# Patient Record
Sex: FEMALE | Race: WHITE | NOT HISPANIC OR LATINO | Employment: OTHER | URBAN - METROPOLITAN AREA
[De-identification: names, ages, dates, MRNs, and addresses within clinical notes are randomized per-mention and may not be internally consistent; named-entity substitution may affect disease eponyms.]

---

## 2024-02-20 ENCOUNTER — ANESTHESIA (OUTPATIENT)
Dept: SURGERY | Facility: CLINIC | Age: 34
End: 2024-02-20
Payer: COMMERCIAL

## 2024-02-20 ENCOUNTER — APPOINTMENT (OUTPATIENT)
Dept: SURGERY | Facility: PHYSICIAN GROUP | Age: 34
End: 2024-02-20
Payer: COMMERCIAL

## 2024-02-20 ENCOUNTER — APPOINTMENT (OUTPATIENT)
Dept: ULTRASOUND IMAGING | Facility: CLINIC | Age: 34
End: 2024-02-20
Attending: EMERGENCY MEDICINE
Payer: COMMERCIAL

## 2024-02-20 ENCOUNTER — HOSPITAL ENCOUNTER (INPATIENT)
Facility: CLINIC | Age: 34
LOS: 1 days | Discharge: HOME | End: 2024-02-21
Attending: EMERGENCY MEDICINE | Admitting: SURGERY
Payer: COMMERCIAL

## 2024-02-20 ENCOUNTER — HOSPITAL ENCOUNTER (OUTPATIENT)
Facility: CLINIC | Age: 34
End: 2024-02-20
Attending: SURGERY | Admitting: SURGERY
Payer: COMMERCIAL

## 2024-02-20 ENCOUNTER — ANESTHESIA EVENT (OUTPATIENT)
Dept: SURGERY | Facility: CLINIC | Age: 34
End: 2024-02-20
Payer: COMMERCIAL

## 2024-02-20 DIAGNOSIS — K35.30 ACUTE APPENDICITIS WITH LOCALIZED PERITONITIS, UNSPECIFIED WHETHER ABSCESS PRESENT, UNSPECIFIED WHETHER GANGRENE PRESENT, UNSPECIFIED WHETHER PERFORATION PRESENT: ICD-10-CM

## 2024-02-20 DIAGNOSIS — K35.30 ACUTE APPENDICITIS WITH LOCALIZED PERITONITIS, WITHOUT PERFORATION, ABSCESS, OR GANGRENE: Primary | ICD-10-CM

## 2024-02-20 LAB
ABO/RH(D): NORMAL
ALBUMIN UR-MCNC: 50 MG/DL
ANION GAP SERPL CALCULATED.3IONS-SCNC: 11 MMOL/L (ref 7–15)
APPEARANCE UR: ABNORMAL
BACTERIA #/AREA URNS HPF: ABNORMAL /HPF
BASOPHILS # BLD AUTO: 0 10E3/UL (ref 0–0.2)
BASOPHILS NFR BLD AUTO: 0 %
BILIRUB UR QL STRIP: NEGATIVE
BUN SERPL-MCNC: 8.1 MG/DL (ref 6–20)
CALCIUM SERPL-MCNC: 9.4 MG/DL (ref 8.6–10)
CHLORIDE SERPL-SCNC: 98 MMOL/L (ref 98–107)
COLOR UR AUTO: ABNORMAL
CREAT SERPL-MCNC: 0.65 MG/DL (ref 0.51–0.95)
DEPRECATED HCO3 PLAS-SCNC: 24 MMOL/L (ref 22–29)
EGFRCR SERPLBLD CKD-EPI 2021: >90 ML/MIN/1.73M2
EOSINOPHIL # BLD AUTO: 0.1 10E3/UL (ref 0–0.7)
EOSINOPHIL NFR BLD AUTO: 0 %
ERYTHROCYTE [DISTWIDTH] IN BLOOD BY AUTOMATED COUNT: 12.9 % (ref 10–15)
FLUAV RNA SPEC QL NAA+PROBE: NEGATIVE
FLUBV RNA RESP QL NAA+PROBE: NEGATIVE
GLUCOSE SERPL-MCNC: 88 MG/DL (ref 70–99)
GLUCOSE UR STRIP-MCNC: NEGATIVE MG/DL
GROUP A STREP BY PCR: NOT DETECTED
HCG INTACT+B SERPL-ACNC: 8435 MIU/ML
HCT VFR BLD AUTO: 35.8 % (ref 35–47)
HGB BLD-MCNC: 12.2 G/DL (ref 11.7–15.7)
HGB UR QL STRIP: ABNORMAL
HOLD SPECIMEN: NORMAL
HOLD SPECIMEN: NORMAL
IMM GRANULOCYTES # BLD: 0.1 10E3/UL
IMM GRANULOCYTES NFR BLD: 1 %
KETONES UR STRIP-MCNC: 40 MG/DL
LEUKOCYTE ESTERASE UR QL STRIP: ABNORMAL
LYMPHOCYTES # BLD AUTO: 1.3 10E3/UL (ref 0.8–5.3)
LYMPHOCYTES NFR BLD AUTO: 9 %
MCH RBC QN AUTO: 29.5 PG (ref 26.5–33)
MCHC RBC AUTO-ENTMCNC: 34.1 G/DL (ref 31.5–36.5)
MCV RBC AUTO: 87 FL (ref 78–100)
MONOCYTES # BLD AUTO: 1.1 10E3/UL (ref 0–1.3)
MONOCYTES NFR BLD AUTO: 8 %
MUCOUS THREADS #/AREA URNS LPF: PRESENT /LPF
NEUTROPHILS # BLD AUTO: 11.2 10E3/UL (ref 1.6–8.3)
NEUTROPHILS NFR BLD AUTO: 82 %
NITRATE UR QL: NEGATIVE
NRBC # BLD AUTO: 0 10E3/UL
NRBC BLD AUTO-RTO: 0 /100
PH UR STRIP: 6.5 [PH] (ref 5–7)
PLATELET # BLD AUTO: 361 10E3/UL (ref 150–450)
POTASSIUM SERPL-SCNC: 3.9 MMOL/L (ref 3.4–5.3)
RBC # BLD AUTO: 4.14 10E6/UL (ref 3.8–5.2)
RBC URINE: 5 /HPF
RSV RNA SPEC NAA+PROBE: NEGATIVE
SARS-COV-2 RNA RESP QL NAA+PROBE: NEGATIVE
SODIUM SERPL-SCNC: 133 MMOL/L (ref 135–145)
SP GR UR STRIP: 1.03 (ref 1–1.03)
SPECIMEN EXPIRATION DATE: NORMAL
SQUAMOUS EPITHELIAL: 4 /HPF
UROBILINOGEN UR STRIP-MCNC: NORMAL MG/DL
WBC # BLD AUTO: 13.8 10E3/UL (ref 4–11)
WBC URINE: 6 /HPF

## 2024-02-20 PROCEDURE — 88304 TISSUE EXAM BY PATHOLOGIST: CPT | Mod: TC | Performed by: SURGERY

## 2024-02-20 PROCEDURE — 258N000003 HC RX IP 258 OP 636: Performed by: ANESTHESIOLOGY

## 2024-02-20 PROCEDURE — 250N000013 HC RX MED GY IP 250 OP 250 PS 637: Performed by: ANESTHESIOLOGY

## 2024-02-20 PROCEDURE — 250N000009 HC RX 250: Performed by: EMERGENCY MEDICINE

## 2024-02-20 PROCEDURE — 250N000011 HC RX IP 250 OP 636: Performed by: ANESTHESIOLOGY

## 2024-02-20 PROCEDURE — 36415 COLL VENOUS BLD VENIPUNCTURE: CPT | Performed by: EMERGENCY MEDICINE

## 2024-02-20 PROCEDURE — 86900 BLOOD TYPING SEROLOGIC ABO: CPT | Performed by: EMERGENCY MEDICINE

## 2024-02-20 PROCEDURE — 250N000025 HC SEVOFLURANE, PER MIN: Performed by: SURGERY

## 2024-02-20 PROCEDURE — 88304 TISSUE EXAM BY PATHOLOGIST: CPT | Mod: 26 | Performed by: PATHOLOGY

## 2024-02-20 PROCEDURE — 44970 LAPAROSCOPY APPENDECTOMY: CPT | Mod: AS | Performed by: PHYSICIAN ASSISTANT

## 2024-02-20 PROCEDURE — 250N000013 HC RX MED GY IP 250 OP 250 PS 637: Performed by: SURGERY

## 2024-02-20 PROCEDURE — 370N000017 HC ANESTHESIA TECHNICAL FEE, PER MIN: Performed by: SURGERY

## 2024-02-20 PROCEDURE — 360N000076 HC SURGERY LEVEL 3, PER MIN: Performed by: SURGERY

## 2024-02-20 PROCEDURE — 272N000001 HC OR GENERAL SUPPLY STERILE: Performed by: SURGERY

## 2024-02-20 PROCEDURE — 44970 LAPAROSCOPY APPENDECTOMY: CPT | Performed by: SURGERY

## 2024-02-20 PROCEDURE — 99285 EMERGENCY DEPT VISIT HI MDM: CPT | Mod: 25

## 2024-02-20 PROCEDURE — 81001 URINALYSIS AUTO W/SCOPE: CPT | Performed by: EMERGENCY MEDICINE

## 2024-02-20 PROCEDURE — 0DTJ4ZZ RESECTION OF APPENDIX, PERCUTANEOUS ENDOSCOPIC APPROACH: ICD-10-PCS | Performed by: SURGERY

## 2024-02-20 PROCEDURE — 84702 CHORIONIC GONADOTROPIN TEST: CPT | Performed by: EMERGENCY MEDICINE

## 2024-02-20 PROCEDURE — 87651 STREP A DNA AMP PROBE: CPT | Performed by: EMERGENCY MEDICINE

## 2024-02-20 PROCEDURE — 76801 OB US < 14 WKS SINGLE FETUS: CPT

## 2024-02-20 PROCEDURE — 250N000011 HC RX IP 250 OP 636

## 2024-02-20 PROCEDURE — 80048 BASIC METABOLIC PNL TOTAL CA: CPT | Performed by: EMERGENCY MEDICINE

## 2024-02-20 PROCEDURE — 710N000009 HC RECOVERY PHASE 1, LEVEL 1, PER MIN: Performed by: SURGERY

## 2024-02-20 PROCEDURE — 99222 1ST HOSP IP/OBS MODERATE 55: CPT | Mod: 57 | Performed by: SURGERY

## 2024-02-20 PROCEDURE — 87086 URINE CULTURE/COLONY COUNT: CPT | Performed by: EMERGENCY MEDICINE

## 2024-02-20 PROCEDURE — 120N000004 HC R&B MS OVERFLOW

## 2024-02-20 PROCEDURE — 250N000011 HC RX IP 250 OP 636: Performed by: SURGERY

## 2024-02-20 PROCEDURE — 99221 1ST HOSP IP/OBS SF/LOW 40: CPT | Performed by: OBSTETRICS & GYNECOLOGY

## 2024-02-20 PROCEDURE — 87637 SARSCOV2&INF A&B&RSV AMP PRB: CPT | Performed by: EMERGENCY MEDICINE

## 2024-02-20 PROCEDURE — 85025 COMPLETE CBC W/AUTO DIFF WBC: CPT | Performed by: EMERGENCY MEDICINE

## 2024-02-20 PROCEDURE — 258N000001 HC RX 258: Performed by: SURGERY

## 2024-02-20 PROCEDURE — 999N000141 HC STATISTIC PRE-PROCEDURE NURSING ASSESSMENT: Performed by: SURGERY

## 2024-02-20 PROCEDURE — 250N000009 HC RX 250: Performed by: ANESTHESIOLOGY

## 2024-02-20 RX ORDER — NALOXONE HYDROCHLORIDE 0.4 MG/ML
0.4 INJECTION, SOLUTION INTRAMUSCULAR; INTRAVENOUS; SUBCUTANEOUS
Status: DISCONTINUED | OUTPATIENT
Start: 2024-02-20 | End: 2024-02-21 | Stop reason: HOSPADM

## 2024-02-20 RX ORDER — PIPERACILLIN SODIUM, TAZOBACTAM SODIUM 3; .375 G/15ML; G/15ML
3.38 INJECTION, POWDER, LYOPHILIZED, FOR SOLUTION INTRAVENOUS EVERY 6 HOURS
Status: DISCONTINUED | OUTPATIENT
Start: 2024-02-20 | End: 2024-02-21 | Stop reason: HOSPADM

## 2024-02-20 RX ORDER — ONDANSETRON 2 MG/ML
4 INJECTION INTRAMUSCULAR; INTRAVENOUS EVERY 30 MIN PRN
Status: DISCONTINUED | OUTPATIENT
Start: 2024-02-20 | End: 2024-02-20 | Stop reason: HOSPADM

## 2024-02-20 RX ORDER — ONDANSETRON 4 MG/1
4 TABLET, ORALLY DISINTEGRATING ORAL EVERY 30 MIN PRN
Status: DISCONTINUED | OUTPATIENT
Start: 2024-02-20 | End: 2024-02-20 | Stop reason: HOSPADM

## 2024-02-20 RX ORDER — CEFAZOLIN SODIUM/WATER 2 G/20 ML
2 SYRINGE (ML) INTRAVENOUS SEE ADMIN INSTRUCTIONS
Status: DISCONTINUED | OUTPATIENT
Start: 2024-02-20 | End: 2024-02-20 | Stop reason: HOSPADM

## 2024-02-20 RX ORDER — ONDANSETRON 4 MG/1
4 TABLET, ORALLY DISINTEGRATING ORAL EVERY 6 HOURS PRN
Status: DISCONTINUED | OUTPATIENT
Start: 2024-02-20 | End: 2024-02-21 | Stop reason: HOSPADM

## 2024-02-20 RX ORDER — LABETALOL HYDROCHLORIDE 5 MG/ML
10 INJECTION, SOLUTION INTRAVENOUS
Status: DISCONTINUED | OUTPATIENT
Start: 2024-02-20 | End: 2024-02-20 | Stop reason: HOSPADM

## 2024-02-20 RX ORDER — OXYCODONE HYDROCHLORIDE 5 MG/1
10 TABLET ORAL EVERY 4 HOURS PRN
Status: DISCONTINUED | OUTPATIENT
Start: 2024-02-20 | End: 2024-02-21 | Stop reason: HOSPADM

## 2024-02-20 RX ORDER — LIDOCAINE 40 MG/G
CREAM TOPICAL
Status: DISCONTINUED | OUTPATIENT
Start: 2024-02-20 | End: 2024-02-20 | Stop reason: HOSPADM

## 2024-02-20 RX ORDER — HYDRALAZINE HYDROCHLORIDE 20 MG/ML
10 INJECTION INTRAMUSCULAR; INTRAVENOUS EVERY 10 MIN PRN
Status: DISCONTINUED | OUTPATIENT
Start: 2024-02-20 | End: 2024-02-20 | Stop reason: HOSPADM

## 2024-02-20 RX ORDER — ONDANSETRON 2 MG/ML
4 INJECTION INTRAMUSCULAR; INTRAVENOUS EVERY 6 HOURS PRN
Status: DISCONTINUED | OUTPATIENT
Start: 2024-02-20 | End: 2024-02-21 | Stop reason: HOSPADM

## 2024-02-20 RX ORDER — SODIUM CHLORIDE, SODIUM LACTATE, POTASSIUM CHLORIDE, CALCIUM CHLORIDE 600; 310; 30; 20 MG/100ML; MG/100ML; MG/100ML; MG/100ML
INJECTION, SOLUTION INTRAVENOUS CONTINUOUS PRN
Status: DISCONTINUED | OUTPATIENT
Start: 2024-02-20 | End: 2024-02-20

## 2024-02-20 RX ORDER — LIDOCAINE HYDROCHLORIDE 20 MG/ML
INJECTION, SOLUTION INFILTRATION; PERINEURAL PRN
Status: DISCONTINUED | OUTPATIENT
Start: 2024-02-20 | End: 2024-02-20

## 2024-02-20 RX ORDER — SODIUM CHLORIDE, SODIUM LACTATE, POTASSIUM CHLORIDE, CALCIUM CHLORIDE 600; 310; 30; 20 MG/100ML; MG/100ML; MG/100ML; MG/100ML
INJECTION, SOLUTION INTRAVENOUS CONTINUOUS
Status: DISCONTINUED | OUTPATIENT
Start: 2024-02-20 | End: 2024-02-20 | Stop reason: HOSPADM

## 2024-02-20 RX ORDER — CEFAZOLIN SODIUM/WATER 2 G/20 ML
2 SYRINGE (ML) INTRAVENOUS
Status: DISCONTINUED | OUTPATIENT
Start: 2024-02-20 | End: 2024-02-20 | Stop reason: HOSPADM

## 2024-02-20 RX ORDER — BISACODYL 10 MG
10 SUPPOSITORY, RECTAL RECTAL DAILY PRN
Status: DISCONTINUED | OUTPATIENT
Start: 2024-02-20 | End: 2024-02-21 | Stop reason: HOSPADM

## 2024-02-20 RX ORDER — DEXMEDETOMIDINE HYDROCHLORIDE 4 UG/ML
INJECTION, SOLUTION INTRAVENOUS CONTINUOUS PRN
Status: DISCONTINUED | OUTPATIENT
Start: 2024-02-20 | End: 2024-02-20

## 2024-02-20 RX ORDER — NALOXONE HYDROCHLORIDE 0.4 MG/ML
0.2 INJECTION, SOLUTION INTRAMUSCULAR; INTRAVENOUS; SUBCUTANEOUS
Status: DISCONTINUED | OUTPATIENT
Start: 2024-02-20 | End: 2024-02-21 | Stop reason: HOSPADM

## 2024-02-20 RX ORDER — HYDROMORPHONE HCL IN WATER/PF 6 MG/30 ML
0.2 PATIENT CONTROLLED ANALGESIA SYRINGE INTRAVENOUS
Status: DISCONTINUED | OUTPATIENT
Start: 2024-02-20 | End: 2024-02-21 | Stop reason: HOSPADM

## 2024-02-20 RX ORDER — METHADONE HYDROCHLORIDE 10 MG/ML
2 INJECTION, SOLUTION INTRAMUSCULAR; INTRAVENOUS; SUBCUTANEOUS 3 TIMES DAILY PRN
Status: DISCONTINUED | OUTPATIENT
Start: 2024-02-20 | End: 2024-02-20 | Stop reason: HOSPADM

## 2024-02-20 RX ORDER — SODIUM CHLORIDE, SODIUM LACTATE, POTASSIUM CHLORIDE, CALCIUM CHLORIDE 600; 310; 30; 20 MG/100ML; MG/100ML; MG/100ML; MG/100ML
INJECTION, SOLUTION INTRAVENOUS CONTINUOUS
Status: DISCONTINUED | OUTPATIENT
Start: 2024-02-20 | End: 2024-02-21 | Stop reason: HOSPADM

## 2024-02-20 RX ORDER — DEXAMETHASONE SODIUM PHOSPHATE 4 MG/ML
INJECTION, SOLUTION INTRA-ARTICULAR; INTRALESIONAL; INTRAMUSCULAR; INTRAVENOUS; SOFT TISSUE PRN
Status: DISCONTINUED | OUTPATIENT
Start: 2024-02-20 | End: 2024-02-20

## 2024-02-20 RX ORDER — ACETAMINOPHEN 325 MG/1
975 TABLET ORAL ONCE
Status: COMPLETED | OUTPATIENT
Start: 2024-02-20 | End: 2024-02-20

## 2024-02-20 RX ORDER — PROCHLORPERAZINE MALEATE 5 MG
10 TABLET ORAL EVERY 6 HOURS PRN
Status: DISCONTINUED | OUTPATIENT
Start: 2024-02-20 | End: 2024-02-21 | Stop reason: HOSPADM

## 2024-02-20 RX ORDER — HYDROMORPHONE HCL IN WATER/PF 6 MG/30 ML
PATIENT CONTROLLED ANALGESIA SYRINGE INTRAVENOUS
Status: COMPLETED
Start: 2024-02-20 | End: 2024-02-20

## 2024-02-20 RX ORDER — CEFOTETAN DISODIUM 1 G/10ML
1 INJECTION, POWDER, FOR SOLUTION INTRAMUSCULAR; INTRAVENOUS ONCE
Status: COMPLETED | OUTPATIENT
Start: 2024-02-20 | End: 2024-02-20

## 2024-02-20 RX ORDER — OXYCODONE HYDROCHLORIDE 5 MG/1
5 TABLET ORAL EVERY 4 HOURS PRN
Status: DISCONTINUED | OUTPATIENT
Start: 2024-02-20 | End: 2024-02-21 | Stop reason: HOSPADM

## 2024-02-20 RX ORDER — HYDROMORPHONE HCL IN WATER/PF 6 MG/30 ML
0.4 PATIENT CONTROLLED ANALGESIA SYRINGE INTRAVENOUS
Status: DISCONTINUED | OUTPATIENT
Start: 2024-02-20 | End: 2024-02-21 | Stop reason: HOSPADM

## 2024-02-20 RX ORDER — METHADONE HYDROCHLORIDE 10 MG/ML
INJECTION, SOLUTION INTRAMUSCULAR; INTRAVENOUS; SUBCUTANEOUS PRN
Status: DISCONTINUED | OUTPATIENT
Start: 2024-02-20 | End: 2024-02-20

## 2024-02-20 RX ORDER — BUPIVACAINE HYDROCHLORIDE 5 MG/ML
INJECTION, SOLUTION EPIDURAL; INTRACAUDAL PRN
Status: DISCONTINUED | OUTPATIENT
Start: 2024-02-20 | End: 2024-02-20 | Stop reason: HOSPADM

## 2024-02-20 RX ORDER — ALBUTEROL SULFATE 0.83 MG/ML
2.5 SOLUTION RESPIRATORY (INHALATION) EVERY 4 HOURS PRN
Status: DISCONTINUED | OUTPATIENT
Start: 2024-02-20 | End: 2024-02-20 | Stop reason: HOSPADM

## 2024-02-20 RX ORDER — PROPOFOL 10 MG/ML
INJECTION, EMULSION INTRAVENOUS PRN
Status: DISCONTINUED | OUTPATIENT
Start: 2024-02-20 | End: 2024-02-20

## 2024-02-20 RX ORDER — POLYETHYLENE GLYCOL 3350 17 G/17G
17 POWDER, FOR SOLUTION ORAL DAILY
Status: DISCONTINUED | OUTPATIENT
Start: 2024-02-21 | End: 2024-02-21 | Stop reason: HOSPADM

## 2024-02-20 RX ORDER — ACETAMINOPHEN 325 MG/1
975 TABLET ORAL EVERY 8 HOURS
Status: DISCONTINUED | OUTPATIENT
Start: 2024-02-20 | End: 2024-02-21 | Stop reason: HOSPADM

## 2024-02-20 RX ORDER — MAGNESIUM SULFATE HEPTAHYDRATE 40 MG/ML
2 INJECTION, SOLUTION INTRAVENOUS
Status: DISCONTINUED | OUTPATIENT
Start: 2024-02-20 | End: 2024-02-20 | Stop reason: HOSPADM

## 2024-02-20 RX ORDER — ACETAMINOPHEN 325 MG/1
650 TABLET ORAL EVERY 4 HOURS PRN
Status: DISCONTINUED | OUTPATIENT
Start: 2024-02-23 | End: 2024-02-21 | Stop reason: HOSPADM

## 2024-02-20 RX ORDER — AMOXICILLIN 250 MG
1 CAPSULE ORAL 2 TIMES DAILY
Status: DISCONTINUED | OUTPATIENT
Start: 2024-02-20 | End: 2024-02-21 | Stop reason: HOSPADM

## 2024-02-20 RX ORDER — ONDANSETRON 2 MG/ML
INJECTION INTRAMUSCULAR; INTRAVENOUS PRN
Status: DISCONTINUED | OUTPATIENT
Start: 2024-02-20 | End: 2024-02-20

## 2024-02-20 RX ORDER — CALCIUM CARBONATE 500 MG/1
500 TABLET, CHEWABLE ORAL 4 TIMES DAILY PRN
Status: DISCONTINUED | OUTPATIENT
Start: 2024-02-20 | End: 2024-02-21 | Stop reason: HOSPADM

## 2024-02-20 RX ORDER — LIDOCAINE 40 MG/G
CREAM TOPICAL
Status: DISCONTINUED | OUTPATIENT
Start: 2024-02-20 | End: 2024-02-21 | Stop reason: HOSPADM

## 2024-02-20 RX ADMIN — HYDROMORPHONE HYDROCHLORIDE 0.2 MG: 0.2 INJECTION, SOLUTION INTRAMUSCULAR; INTRAVENOUS; SUBCUTANEOUS at 14:21

## 2024-02-20 RX ADMIN — SENNOSIDES AND DOCUSATE SODIUM 1 TABLET: 8.6; 5 TABLET ORAL at 20:42

## 2024-02-20 RX ADMIN — METHADONE HYDROCHLORIDE 10 MG: 10 INJECTION, SOLUTION INTRAMUSCULAR; INTRAVENOUS; SUBCUTANEOUS at 11:07

## 2024-02-20 RX ADMIN — PROPOFOL 150 MG: 10 INJECTION, EMULSION INTRAVENOUS at 11:07

## 2024-02-20 RX ADMIN — DEXAMETHASONE SODIUM PHOSPHATE 8 MG: 4 INJECTION, SOLUTION INTRA-ARTICULAR; INTRALESIONAL; INTRAMUSCULAR; INTRAVENOUS; SOFT TISSUE at 11:07

## 2024-02-20 RX ADMIN — PROPOFOL 50 MG: 10 INJECTION, EMULSION INTRAVENOUS at 11:09

## 2024-02-20 RX ADMIN — CEFOTETAN DISODIUM 1 G: 1 INJECTION, POWDER, FOR SOLUTION INTRAMUSCULAR; INTRAVENOUS at 09:28

## 2024-02-20 RX ADMIN — PIPERACILLIN AND TAZOBACTAM 3.38 G: 3; .375 INJECTION, POWDER, LYOPHILIZED, FOR SOLUTION INTRAVENOUS at 15:35

## 2024-02-20 RX ADMIN — LIDOCAINE HYDROCHLORIDE 50 MG: 20 INJECTION, SOLUTION INFILTRATION; PERINEURAL at 11:07

## 2024-02-20 RX ADMIN — SODIUM CHLORIDE, POTASSIUM CHLORIDE, SODIUM LACTATE AND CALCIUM CHLORIDE: 600; 310; 30; 20 INJECTION, SOLUTION INTRAVENOUS at 10:16

## 2024-02-20 RX ADMIN — SODIUM CHLORIDE, POTASSIUM CHLORIDE, SODIUM LACTATE AND CALCIUM CHLORIDE: 600; 310; 30; 20 INJECTION, SOLUTION INTRAVENOUS at 10:58

## 2024-02-20 RX ADMIN — PHENYLEPHRINE HYDROCHLORIDE 100 MCG: 10 INJECTION INTRAVENOUS at 11:21

## 2024-02-20 RX ADMIN — ACETAMINOPHEN 975 MG: 325 TABLET, FILM COATED ORAL at 16:48

## 2024-02-20 RX ADMIN — ACETAMINOPHEN 975 MG: 325 TABLET, FILM COATED ORAL at 10:15

## 2024-02-20 RX ADMIN — DEXMEDETOMIDINE 0.5 MCG/KG/HR: 100 INJECTION, SOLUTION, CONCENTRATE INTRAVENOUS at 11:11

## 2024-02-20 RX ADMIN — Medication 2 G: at 11:01

## 2024-02-20 RX ADMIN — SODIUM CHLORIDE, POTASSIUM CHLORIDE, SODIUM LACTATE AND CALCIUM CHLORIDE: 600; 310; 30; 20 INJECTION, SOLUTION INTRAVENOUS at 11:41

## 2024-02-20 RX ADMIN — METHADONE HYDROCHLORIDE 2 MG: 10 INJECTION, SOLUTION INTRAMUSCULAR; INTRAVENOUS; SUBCUTANEOUS at 12:38

## 2024-02-20 RX ADMIN — HYDROMORPHONE HYDROCHLORIDE 0.2 MG: 0.2 INJECTION, SOLUTION INTRAMUSCULAR; INTRAVENOUS; SUBCUTANEOUS at 20:42

## 2024-02-20 RX ADMIN — PHENYLEPHRINE HYDROCHLORIDE 100 MCG: 10 INJECTION INTRAVENOUS at 11:11

## 2024-02-20 RX ADMIN — SUGAMMADEX 200 MG: 100 INJECTION, SOLUTION INTRAVENOUS at 11:38

## 2024-02-20 RX ADMIN — ROCURONIUM BROMIDE 50 MG: 50 INJECTION, SOLUTION INTRAVENOUS at 11:07

## 2024-02-20 RX ADMIN — PIPERACILLIN AND TAZOBACTAM 3.38 G: 3; .375 INJECTION, POWDER, LYOPHILIZED, FOR SOLUTION INTRAVENOUS at 20:42

## 2024-02-20 RX ADMIN — ONDANSETRON 4 MG: 2 INJECTION INTRAMUSCULAR; INTRAVENOUS at 11:07

## 2024-02-20 ASSESSMENT — ACTIVITIES OF DAILY LIVING (ADL)
ADLS_ACUITY_SCORE: 36
ADLS_ACUITY_SCORE: 35
ADLS_ACUITY_SCORE: 35
ADLS_ACUITY_SCORE: 37
ADLS_ACUITY_SCORE: 35

## 2024-02-20 NOTE — PROGRESS NOTES
OB RN attempted to hear fetal heart tones. Unable. Pt 5 weeks pregnant    Pt complaining of neck pain. States she sometimes has issues. Dr Piper notified. No new orders. Neck massaged and ice applied. Feels better per patient.

## 2024-02-20 NOTE — PROGRESS NOTES
Patient reports eating half a granola bar and some sips of water at 6:30 a.m.--communicated this information to anesthesiologist, brigida Shah to proceed with planned procedure.

## 2024-02-20 NOTE — ED TRIAGE NOTES
Patient is 5 weeks pregnant and presents with complaint of mid abd pain for 5 days, pain has increased in the last 36 hours.  Also complaining of nausea which she hasn't been so far in the pregnancy.  Patient states pain is worse when she lays down, also had a fever of 100 last night.     Triage Assessment (Adult)       Row Name 02/20/24 0601          Triage Assessment    Airway WDL WDL        Respiratory WDL    Respiratory WDL WDL        Skin Circulation/Temperature WDL    Skin Circulation/Temperature WDL WDL        Cardiac WDL    Cardiac WDL WDL        Peripheral/Neurovascular WDL    Peripheral Neurovascular WDL WDL        Cognitive/Neuro/Behavioral WDL    Cognitive/Neuro/Behavioral WDL WDL

## 2024-02-20 NOTE — CONSULTS
[facility]  General Surgery Consult    Heather Ford MRN# [id number]   Age: 33 year old YOB: 1990     HPI:  The patient has been experiencing acute RLQ and periumbilical abdominal pain for the past 5 days associated with fever (100) and anorexia.  Negative for associated vomiting. She is five weeks pregnant (fifth pregnancy). She has not had prior abdominal surgery.     Similar pain in the past:    No  Diarrhea, now or in the past:   No  Colonoscopy:   No    History is obtained from the patient.    Review Of Systems:  The 10 point review of systems is negative other than noted in the HPI.    PMH:  No past medical history on file.    PSH:  No past surgical history on file.    Allergies:  No Known Allergies    Home Medications:  No current outpatient medications on file.       Social History:  , spouse at bedside.    Family History:  No family history chronic diarrhea, inflammatory bowel disease or colon cancer.  No bleeding disorders, clotting disorders, or problems with anesthesia.    Physical Exam:  /80   Pulse 96   Temp 97.3  F (36.3  C) (Temporal)   Resp 18   SpO2 100%   General appearance: Resting Comfortably in bed, no apparent distress  Neck: Supple without thyromegaly, lymphadenopathy, masses  Lungs: Clear to auscultation bilaterally  Heart: regular rate and rhythm, no murmurs, rubs, or gallops  Abdomen: flat, normal bowel sounds    Tenderness: present: RLQ moderate, positive rebound tenderness   Masses: none   Organomegaly: none  Extremities: Without clubbing, cyanosis, edema  Neurologic: Grossly intact times four extremities, alert and oriented times three  Psychiatric: Mood and affect are appropriate  Skin: Without lesions or rashes      Labs Reviewed:  Lab Results   Component Value Date    WBC 13.8 02/20/2024     Lab Results   Component Value Date    HGB 12.2 02/20/2024     Lab Results   Component Value Date     02/20/2024     Last Basic Metabolic  Panel:  Lab Results   Component Value Date     02/20/2024      Lab Results   Component Value Date    POTASSIUM 3.9 02/20/2024     Lab Results   Component Value Date    CHLORIDE 98 02/20/2024     Lab Results   Component Value Date    JOHAN 9.4 02/20/2024     Lab Results   Component Value Date    CO2 24 02/20/2024     Lab Results   Component Value Date    BUN 8.1 02/20/2024     Lab Results   Component Value Date    CR 0.65 02/20/2024     Lab Results   Component Value Date    GLC 88 02/20/2024       Radiology:  All imaging studies reviewed by me.    Results for orders placed or performed during the hospital encounter of 02/20/24   US OB < 14 Weeks w Transvaginal    Narrative    ULTRASOUND OBSTETRIC <14 WEEKS WITH TRANSVAGINAL SINGLE February 20, 2024 7:45 AM    CLINICAL HISTORY: Abdominal pain during early pregnancy.    TECHNIQUE: Transabdominal scans were performed. Endovaginal ultrasound  was performed to better visualize the embryo.    COMPARISON: None.    FINDINGS:  UTERUS: Single intrauterine gestation sac containing a yolk sac and  embryo, but no embryonic cardiac activity is identified.  CRL: measures 2 mm, equals 5 weeks 6 days.  FETAL HEART RATE: None detected, possibly due to the small size of the  embryo.  AMNIOTIC FLUID: Normal.  PLACENTA: Not yet formed. Small area of sub-chorionic hemorrhage  measures 2.4 x 0.5 x 3.3 cm.    RIGHT OVARY: Normal.  LEFT OVARY: Normal.    Small amount of anechoic free fluid in the cul-de-sac. A blind-ending  tubular structure in the right lower quadrant measures up to 1.5 cm,  and is suspicious for a thickened appendix.      Impression    IMPRESSION:   1.  Single intrauterine gestation at 5 weeks 6 days, EDC 10/16/2024.  2.  No embryonic cardiac activity is identified, possibly due to the  small size of the embryo. Clinical/laboratory correlation and close  follow-up is recommended to confirm a viable intrauterine gestation.  3.  Blind-ending tubular structure in the  right lower quadrant  measures 1.5 cm and is suspicious for a thickened appendix; acute  appendicitis could have this appearance.  4.  Small amount of nonspecific free fluid in the pelvis.           ASSESSMENT/PLAN:  The patient's history, physical exam, laboratory and imaging studies are suspicious for acute appendicitis.  I have offered the patient a laparoscopic appendectomy.      We have had a detailed discussion of nature of appendicitis, the procedure, its risks, benefits, alternatives, recovery, postop limitations, anesthesia, bleeding, blood transfusion,  DVT, PE, postoperative infections, injury to adjacent organs and structures, open conversion, bowel resection, prolonged convalescence in the event of gangrene or perforation of the appendix, abdominal wall hernia, intraabdominal adhesions which can lead to bowel obstruction.  We have discussed interventions and treatment for these complications.  The patient understands the possibility of a diagnosis other than appendicitis.  All questions have been answered to the best of my ability.  We discussed additional modalities of imaging; CT is relatively contraindicated in pregnancy, risks of MRI are not completely known but the exam is lengthy. Given a high pre-test probability, elevated WBC and tenderness at McBurney's point, I believe appendicitis is highly likely.  We discussed risk of  labor and that this is elevated beyond the risk of anesthesia if she has perforation of her appendix.  I've asked that our OB physicians discuss anesthesia risk with her as well.     This case was discussed with ED physician, Dr. Deshpande.    She elects to proceed.      Pre-operative antibiotics have been given.     Eliseo Sinha MD

## 2024-02-20 NOTE — ED PROVIDER NOTES
History     Chief Complaint:  Abdominal Pain     The history is provided by the patient and the spouse.      Heather Ford is a 33 year old , 5 weeks pregnant female with a history of asthma presenting with abdominal pain for the past five days (2/15/24). The pain worsens when laying down, eating, drinking, or changing positions. The pain has been waxing and waning. Patient reports constant pain over the past few days. Heather reports centralized pain and a separate pain that persists on the outside of the abdomen. Last night, she had a fever of 100.9 F and experiencing an episode of vomiting. She did not take Tylenol to resolve symptoms. Patient reports constipation, but attributes this to early pregnancy. Patient typically reports one bowel movement per day. Heather has been experiencing good appetite, that decreased yesterday. Patient endorses pain that radiates to the back. She denies vaginal pain, vaginal bleeding, abnormal vaginal discharge, dysuria, or pain with urination. No history of similar symptoms. No history of abdominal surgery. No known ill contacts. No other medical problems.     Independent Historian:   Patient's  present at bedside and corroborates the above.     Review of External Notes:   None available     Medications:    Albuterol     Past Medical History:    Asthma     Physical Exam   Patient Vitals for the past 24 hrs:   BP Temp Temp src Pulse Resp SpO2   24 0600 109/80 97.3  F (36.3  C) Temporal 96 18 100 %      Physical Exam  General:              Well-nourished              Speaking in full sentences  Eyes:              Conjunctiva without injection or scleral icterus  ENT:              Moist mucous membranes              Nares patent              Pinnae normal  Neck:              Full ROM              No stiffness appreciated  Resp:              Lungs CTAB              No crackles, wheezing or audible rubs              Good air movement  CV:                    Normal rate,  regular rhythm              S1 and S2 present              No murmur, gallop or rub  GI:              BS present              Abdomen soft without distention              Tenderness to palpation diffusely throughout abdomen, though maximal to lower abdomen including RLQ              No palpable masses  Skin:              Warm, dry, well perfused              No rashes or open wounds on exposed skin  MSK:              Moves all extremities              No focal deformities or swelling  Neuro:              Alert              Answers questions appropriately              Moves all extremities equally              Gait stable  Psych:              Normal affect, normal mood    Emergency Department Course     Imaging:  US OB < 14 Weeks w Transvaginal   Final Result   IMPRESSION:    1.  Single intrauterine gestation at 5 weeks 6 days, EDC 10/16/2024.   2.  No embryonic cardiac activity is identified, possibly due to the   small size of the embryo. Clinical/laboratory correlation and close   follow-up is recommended to confirm a viable intrauterine gestation.   3.  Blind-ending tubular structure in the right lower quadrant   measures 1.5 cm and is suspicious for a thickened appendix; acute   appendicitis could have this appearance.   4.  Small amount of nonspecific free fluid in the pelvis.         KEENA JARVIS MD            SYSTEM ID:  [id number]         Laboratory:  Labs Ordered and Resulted from Time of ED Arrival to Time of ED Departure   ROUTINE UA WITH MICROSCOPIC REFLEX TO CULTURE - Abnormal       Result Value    Color Urine Orange (*)     Appearance Urine Slightly Cloudy (*)     Glucose Urine Negative      Bilirubin Urine Negative      Ketones Urine 40 (*)     Specific Gravity Urine 1.028      Blood Urine Trace (*)     pH Urine 6.5      Protein Albumin Urine 50 (*)     Urobilinogen Urine Normal      Nitrite Urine Negative      Leukocyte Esterase Urine Moderate (*)     Bacteria Urine Moderate (*)     Mucus Urine  Present (*)     RBC Urine 5 (*)     WBC Urine 6 (*)     Squamous Epithelials Urine 4 (*)    BASIC METABOLIC PANEL - Abnormal    Sodium 133 (*)     Potassium 3.9      Chloride 98      Carbon Dioxide (CO2) 24      Anion Gap 11      Urea Nitrogen 8.1      Creatinine 0.65      GFR Estimate >90      Calcium 9.4      Glucose 88     HCG QUANTITATIVE PREGNANCY - Abnormal    hCG Quantitative 8,435 (*)    CBC WITH PLATELETS AND DIFFERENTIAL - Abnormal    WBC Count 13.8 (*)     RBC Count 4.14      Hemoglobin 12.2      Hematocrit 35.8      MCV 87      MCH 29.5      MCHC 34.1      RDW 12.9      Platelet Count 361      % Neutrophils 82      % Lymphocytes 9      % Monocytes 8      % Eosinophils 0      % Basophils 0      % Immature Granulocytes 1      NRBCs per 100 WBC 0      Absolute Neutrophils 11.2 (*)     Absolute Lymphocytes 1.3      Absolute Monocytes 1.1      Absolute Eosinophils 0.1      Absolute Basophils 0.0      Absolute Immature Granulocytes 0.1      Absolute NRBCs 0.0     INFLUENZA A/B, RSV, & SARS-COV2 PCR - Normal    Influenza A PCR Negative      Influenza B PCR Negative      RSV PCR Negative      SARS CoV2 PCR Negative     GROUP A STREPTOCOCCUS PCR THROAT SWAB - Normal    Group A strep by PCR Not Detected     ABO AND RH    ABO/RH(D) A POS      SPECIMEN EXPIRATION DATE [id number]     URINE CULTURE     Emergency Department Course & Assessments:    Interventions:  Medications   cefoTEtan (CEFOTAN) 1 g vial to attach to  ml bag (1 g Intravenous $New Bag 2/20/24 0928)      Independent Interpretation (X-rays, CTs, rhythm strip):  None    Assessments/Consultations/Discussion of Management or Tests:  ED Course as of 02/20/24 0929 Tue Feb 20, 2024   0704 I obtained the history and examined the patient as noted above.      0824 I spoke with Dr. Frederick from general surgery regarding the patient's presentation and plan of care.      0829 I rechecked and updated the patient.      0911 I spoke with Dr. Tolbert from  OB GYN regarding the patient's presentation and plan of care.      0916 I rechecked and updated the patient.        Social Determinants of Health affecting care:   Caring Spouse    Disposition:  The patient was transferred to the OR under the care of Dr. Frederick.    Impression & Plan      Medical Decision Making:  Heather Ford is a very pleasant  at approximately 5 wk EGA presenting to the emergency department for evaluation of abdominal pain.  VS on presentation unremarkable.  Broad differential diagnosis considered including though not limited to, ectopic pregnancy, appendicitis, bowel obstruction, perforation, UTI/pyelonephritis, among others.  Acknowledging pregnant state, initial workup included ultrasound and laboratory studies.  Patient is Rh+, and quantitative hCG returned detectable at 8435.  Pelvic ultrasound confirms single intrauterine gestational sac at 5 weeks 6 days EGA, as well as a yolk sac, though no fetal heart rate yet detected, possibly due to the small size of the embryo.  She has noted to have a small subchorionic hemorrhage, which is incidental.  Of note, and based on the above history and evaluation, concern is for acute appendicitis as the cause of patient's presenting symptoms.  On ultrasound, she was found to have a blind-ending tubular structure to the right lower quadrant measuring 1.5 cm in size, concerning for acute appendicitis.  This is consistent with patient's symptomatology as noted above, and also supported by leukocytosis.  I discussed the case with both general surgery and OB/GYN, who have graciously seen and evaluated the patient at bedside.  Appreciate their consultation, and they are in agreement with proceeding with surgical intervention for definitive management as offering best outcome for both mother and developing fetus.  We discussed at length options for further imaging including CT/MRI, though also note this would potentially pose increased risk to develop  embryo/fetus, and patient is wishing to go on this.  She will started on Cefotan and will be made n.p.o. in anticipation of surgical intervention later this morning.  She will be transferred to the preoperative area, under the care of Dr. Mustafa.  Questions answered to patient and spouse prior to transfer.     Diagnosis:    ICD-10-CM    1. Acute appendicitis with localized peritonitis, without perforation, abscess, or gangrene  K35.30 Case Request: APPENDECTOMY, LAPAROSCOPIC     Case Request: APPENDECTOMY, LAPAROSCOPIC      2. Acute appendicitis with localized peritonitis, unspecified whether abscess present, unspecified whether gangrene present, unspecified whether perforation present  K35.30          Scribe Disclosure:  Bibiana COWAN, am serving as a scribe at 7:02 AM on 2/20/2024 to document services personally performed by Albaro Deshpande MD based on my observations and the provider's statements to me.  2/20/2024   Albaro Deshpande MD Roach, Brian Donald, MD  02/20/24 1012

## 2024-02-20 NOTE — ANESTHESIA CARE TRANSFER NOTE
Patient: Heather Ford    Procedure: Procedure(s):  APPENDECTOMY, LAPAROSCOPIC       Diagnosis: Acute appendicitis with localized peritonitis, without perforation, abscess, or gangrene [K35.30]  Diagnosis Additional Information: No value filed.    Anesthesia Type:   General     Note:    Oropharynx: oropharynx clear of all foreign objects and spontaneously breathing  Level of Consciousness: drowsy  Oxygen Supplementation: face mask  Level of Supplemental Oxygen (L/min / FiO2): 5  Independent Airway: airway patency satisfactory and stable  Dentition: dentition unchanged  Vital Signs Stable: post-procedure vital signs reviewed and stable  Report to RN Given: handoff report given  Patient transferred to: PACU    Handoff Report: Identifed the Patient, Identified the Reponsible Provider, Reviewed the pertinent medical history, Discussed the surgical course, Reviewed Intra-OP anesthesia mangement and issues during anesthesia, Set expectations for post-procedure period and Allowed opportunity for questions and acknowledgement of understanding  Vitals:  Vitals Value Taken Time   /62 02/20/24 1155   Temp 97.5  F (36.4  C) 02/20/24 1155   Pulse 65 02/20/24 1157   Resp 19 02/20/24 1157   SpO2 100 % 02/20/24 1157   Vitals shown include unfiled device data.    Electronically Signed By: ILA Marie CRNA  February 20, 2024  11:59 AM

## 2024-02-20 NOTE — ANESTHESIA PROCEDURE NOTES
Airway       Patient location during procedure: OR       Procedure Start/Stop Times: 2/20/2024 11:09 AM  Staff -        Anesthesiologist:  Mir Piper MD       CRNA: Carmenza Fuentes APRN CRNA       Performed By: CRNA  Consent for Airway        Urgency: elective  Indications and Patient Condition       Indications for airway management: zelda-procedural       Induction type:intravenous       Mask difficulty assessment: 1 - vent by mask    Final Airway Details       Final airway type: endotracheal airway       Successful airway: ETT - single  Endotracheal Airway Details        ETT size (mm): 7.0       Cuffed: yes       Cuff volume (mL): 7       Successful intubation technique: direct laryngoscopy       DL Blade Type: MAC 4       Grade View of Cords: 1       Adjucts: stylet       Position: Right       Measured from: gums/teeth       Secured at (cm): 22       Bite block used: None    Post intubation assessment        Placement verified by: capnometry, equal breath sounds and chest rise        Number of attempts at approach: 1       Number of other approaches attempted: 0       Secured with: tape       Ease of procedure: easy       Dentition: Intact and Unchanged    Medication(s) Administered   Medication Administration Time: 2/20/2024 11:09 AM

## 2024-02-20 NOTE — ANESTHESIA PREPROCEDURE EVALUATION
"Anesthesia Pre-Procedure Evaluation    Patient: Heather Ford   MRN: [id number] : 1990        Procedure : Procedure(s):  APPENDECTOMY, LAPAROSCOPIC          History reviewed. No pertinent past medical history.   History reviewed. No pertinent surgical history.   No Known Allergies   Social History     Tobacco Use    Smoking status: Never    Smokeless tobacco: Never   Substance Use Topics    Alcohol use: Not on file      Wt Readings from Last 1 Encounters:   No data found for Wt        Anesthesia Evaluation   Pt has not had prior anesthetic         ROS/MED HX  ENT/Pulmonary:  - neg pulmonary ROS     Neurologic:  - neg neurologic ROS     Cardiovascular:  - neg cardiovascular ROS     METS/Exercise Tolerance:     Hematologic:  - neg hematologic  ROS     Musculoskeletal:  - neg musculoskeletal ROS     GI/Hepatic:     (+)         appendicitis,           Renal/Genitourinary:  - neg Renal ROS     Endo:  - neg endo ROS     Psychiatric/Substance Use:       Infectious Disease:  - neg infectious disease ROS     Malignancy:       Other:      (+) Possibly pregnant (5 weeks preg), , ,         Physical Exam    Airway        Mallampati: II   TM distance: > 3 FB   Neck ROM: full   Mouth opening: > 3 cm    Respiratory Devices and Support         Dental       (+) Minor Abnormalities - some fillings, tiny chips      Cardiovascular          Rhythm and rate: regular and normal     Pulmonary           breath sounds clear to auscultation           OUTSIDE LABS:  CBC:   Lab Results   Component Value Date    WBC 13.8 (H) 2024    HGB 12.2 2024    HCT 35.8 2024     2024     BMP:   Lab Results   Component Value Date     (L) 2024    POTASSIUM 3.9 2024    CHLORIDE 98 2024    CO2 24 2024    BUN 8.1 2024    CR 0.65 2024    GLC 88 2024     COAGS: No results found for: \"PTT\", \"INR\", \"FIBR\"  POC: No results found for: \"BGM\", \"HCG\", \"HCGS\"  HEPATIC: No results found " "for: \"ALBUMIN\", \"PROTTOTAL\", \"ALT\", \"AST\", \"GGT\", \"ALKPHOS\", \"BILITOTAL\", \"BILIDIRECT\", \"JIMBO\"  OTHER:   Lab Results   Component Value Date    JOHAN 9.4 02/20/2024       Anesthesia Plan    ASA Status:  2    NPO Status:  NPO Appropriate    Anesthesia Type: General.     - Airway: ETT   Induction: Intravenous.   Maintenance: Balanced.   Techniques and Equipment:       - Drips/Meds: Dexmed. infusion     Consents    Anesthesia Plan(s) and associated risks, benefits, and realistic alternatives discussed. Questions answered and patient/representative(s) expressed understanding.     - Discussed:     - Discussed with:  Patient      - Extended Intubation/Ventilatory Support Discussed: No.      - Patient is DNR/DNI Status: No     Use of blood products discussed: No .     Postoperative Care    Pain management: IV analgesics, Oral pain medications, Multi-modal analgesia.     - Plan for long acting post-op opioid use   PONV prophylaxis: Dexamethasone or Solumedrol, Ondansetron (or other 5HT-3)     Comments:    Other Comments: No toradol or midazolam  Discussed potential for perioperative miscarriage.  Will maintain good maternal hemodynamics and avoid fetotoxic medications.       Methadone 10 mg  Decadron 8 mg  Zofran 4 mg  Precedex  Sugammadex if paralytic to be used            Mir Piper MD         # Hyponatremia: Lowest Na = 133 mmol/L in last 30 days, will monitor as appropriate              "

## 2024-02-20 NOTE — ANESTHESIA POSTPROCEDURE EVALUATION
Patient: Heather Ford    Procedure: Procedure(s):  APPENDECTOMY, LAPAROSCOPIC       Anesthesia Type:  General    Note:  Disposition: Admission   Postop Pain Control: Uneventful            Sign Out: Well controlled pain   PONV: No   Neuro/Psych: Uneventful            Sign Out: Acceptable/Baseline neuro status   Airway/Respiratory: Uneventful            Sign Out: Acceptable/Baseline resp. status   CV/Hemodynamics: Uneventful            Sign Out: Acceptable CV status   Other NRE: NONE   DID A NON-ROUTINE EVENT OCCUR? No           Last vitals:  Vitals Value Taken Time   /63 02/20/24 1330   Temp 97.5  F (36.4  C) 02/20/24 1248   Pulse 77 02/20/24 1332   Resp 15 02/20/24 1332   SpO2 98 % 02/20/24 1332   Vitals shown include unfiled device data.    Electronically Signed By: Mir Piper MD  February 20, 2024  4:22 PM

## 2024-02-20 NOTE — OP NOTE
General Surgery Operative Note      Pre-operative diagnosis: acute appendicitis   Post-operative diagnosis: acute perforated appendicitis    Procedure: laparoscopic appendectomy   Surgeon: Eliseo Sinha MD   Assistant(s): Kiesha Luke PA-C  The Physician Assistant was medically necessary for their expertise in prepping, camera management, suctioning, suturing and retraction.   Anesthesia: general    Estimated blood loss:  Complications: 10 cc  none   Specimens: ID Type Source Tests Collected by Time Destination   1 : Appendix Tissue Appendix SURGICAL PATHOLOGY EXAM Eliseo Sinha MD 2/20/2024 11:32 AM         DESCRIPTION OF PROCEDURE:  The patient was placed on the table in supine position.  General endotracheal anesthesia was induced and the abdomen was prepped and draped in standard sterile fashion.  An incision was made just above the umbilicus with a blade.  The incision was carried down to the fascia.  Fascia was incised with a blade.  The peritoneum was entered bluntly with a Carmalt clamp.  Two interrupted 0 Vicryl sutures were placed at the extremes of this fascial incision.  The Maryam trocar was introduced and the abdomen was insufflated with CO2.  Two 5 mm trocars were placed in the infraumbilical midline, one two fingerbreadths above the pubic symphysis and one penitentiary between the pubic symphysis and the umbilicus.  The patient was placed in Trendelenburg and right side up.  The appendix was identified in the right lower quadrant.  It appeared edematous and erythematous and had a small adjacent abscess encountered while bluntly mobilizing it from the pelvis. This small amount of contamination was immediately aspirated.  The appendix was freed up from the mesoappendix at its base using a Maryland dissector.  The base of the appendix was ligated and divided with the Endo-AAKASH stapler.  The mesoappendix was ligated with a LigaSure.  The appendix was passed into an Endocatch bag and  removed through the umbilical trocar site.  The right lower quadrant was inspected for hemostasis.  Hemostasis was assured.  We irrigated with copious amounts of sterile saline and aspirated the effluent.  The 2 infraumbilical trocars were removed under direct visualization.  There was no bleeding from either of these sites.  The Maryam trocar was removed and the abdomen was evacuated of CO2.  An additional interrupted 0 Vicryl suture was placed in the umbilical trocar site fascia.  Each of the three 0 Vicryl sutures was cinched down and tied.  The skin of all 3 incisions was anesthetized with local anesthetic and closed with interrupted 4-0 Vicryl subcuticular sutures and Dermabond.  The patient tolerated the procedure well.  Sponge and instrument counts were correct.    Eliseo Sinha MD

## 2024-02-20 NOTE — ED NOTES
[facility]  ED Nurse Handoff Report    ED Chief complaint: Abdominal Pain  . ED Diagnosis:   Final diagnoses:   Acute appendicitis with localized peritonitis, without perforation, abscess, or gangrene       Allergies: No Known Allergies    Code Status: Full Code    Activity level - Baseline/Home:  independent.  Activity Level - Current:   independent.   Lift room needed: No.   Bariatric: No   Needed: No   Isolation: No.   Infection: Not Applicable.     Respiratory status: Room air    Vital Signs (within 30 minutes):   Vitals:    02/20/24 0600   BP: 109/80   Pulse: 96   Resp: 18   Temp: 97.3  F (36.3  C)   TempSrc: Temporal   SpO2: 100%       Cardiac Rhythm:  ,      Pain level:    Patient confused: No.   Patient Falls Risk: patient and family education.   Elimination Status: Has voided     Patient Report - Initial Complaint: Patient is 5 weeks pregnant and presents with complaint of mid abd pain for 5 days, pain has increased in the last 36 hours. Also complaining of nausea which she hasn't been so far in the pregnancy. Patient states pain is worse when she lays down, also had a fever of 100 last night.   Focused Assessment:     Gastrointestinal      GastrointestinalGastrointestinal WDL: .WDL except; GI symptomsGI Signs/Symptoms: abdominal discomfort        Neuro Cognitive      Neuro CognitiveCognitive/Neuro/Behavioral WDL: WDL        OB/GYN/Reproductive      OB/GYN/ReproductiveAdditional Documentation:  (5 weeks pregnant)          Abnormal Results:   Labs Ordered and Resulted from Time of ED Arrival to Time of ED Departure   ROUTINE UA WITH MICROSCOPIC REFLEX TO CULTURE - Abnormal       Result Value    Color Urine Orange (*)     Appearance Urine Slightly Cloudy (*)     Glucose Urine Negative      Bilirubin Urine Negative      Ketones Urine 40 (*)     Specific Gravity Urine 1.028      Blood Urine Trace (*)     pH Urine 6.5      Protein Albumin Urine 50 (*)     Urobilinogen Urine  Normal      Nitrite Urine Negative      Leukocyte Esterase Urine Moderate (*)     Bacteria Urine Moderate (*)     Mucus Urine Present (*)     RBC Urine 5 (*)     WBC Urine 6 (*)     Squamous Epithelials Urine 4 (*)    BASIC METABOLIC PANEL - Abnormal    Sodium 133 (*)     Potassium 3.9      Chloride 98      Carbon Dioxide (CO2) 24      Anion Gap 11      Urea Nitrogen 8.1      Creatinine 0.65      GFR Estimate >90      Calcium 9.4      Glucose 88     HCG QUANTITATIVE PREGNANCY - Abnormal    hCG Quantitative 8,435 (*)    CBC WITH PLATELETS AND DIFFERENTIAL - Abnormal    WBC Count 13.8 (*)     RBC Count 4.14      Hemoglobin 12.2      Hematocrit 35.8      MCV 87      MCH 29.5      MCHC 34.1      RDW 12.9      Platelet Count 361      % Neutrophils 82      % Lymphocytes 9      % Monocytes 8      % Eosinophils 0      % Basophils 0      % Immature Granulocytes 1      NRBCs per 100 WBC 0      Absolute Neutrophils 11.2 (*)     Absolute Lymphocytes 1.3      Absolute Monocytes 1.1      Absolute Eosinophils 0.1      Absolute Basophils 0.0      Absolute Immature Granulocytes 0.1      Absolute NRBCs 0.0     INFLUENZA A/B, RSV, & SARS-COV2 PCR - Normal    Influenza A PCR Negative      Influenza B PCR Negative      RSV PCR Negative      SARS CoV2 PCR Negative     GROUP A STREPTOCOCCUS PCR THROAT SWAB - Normal    Group A strep by PCR Not Detected     ABO AND RH    ABO/RH(D) A POS      SPECIMEN EXPIRATION DATE [id number]     URINE CULTURE        US OB < 14 Weeks w Transvaginal   Preliminary Result   IMPRESSION:    1.  Single intrauterine gestation at 5 weeks 6 days, EDC 10/16/2024.   2.  No embryonic cardiac activity is identified, possibly due to the   small size of the embryo. Clinical/laboratory correlation and close   follow-up is recommended to confirm a viable intrauterine gestation.   3.  Blind-ending tubular structure in the right lower quadrant   measures 1.5 cm and is suspicious for a thickened appendix; acute    appendicitis could have this appearance.   4.  Small amount of nonspecific free fluid in the pelvis.         US Appendix Only    (Results Pending)       Treatments provided: See MAR  Family Comments: Spouse at bedside  OBS brochure/video discussed/provided to patient:  Yes  ED Medications: Medications - No data to display    Drips infusing:  No  For the majority of the shift this patient was Green.   Interventions performed were N/A.    Sepsis treatment initiated: No    Cares/treatment/interventions/medications to be completed following ED care: 5 weeks pregnant    ED Nurse Name: Ana Joiner RN  8:50 AM

## 2024-02-21 VITALS
SYSTOLIC BLOOD PRESSURE: 100 MMHG | OXYGEN SATURATION: 99 % | RESPIRATION RATE: 12 BRPM | WEIGHT: 119 LBS | HEART RATE: 71 BPM | DIASTOLIC BLOOD PRESSURE: 55 MMHG | TEMPERATURE: 98.3 F

## 2024-02-21 LAB
BACTERIA UR CULT: NO GROWTH
PATH REPORT.COMMENTS IMP SPEC: NORMAL
PATH REPORT.COMMENTS IMP SPEC: NORMAL
PATH REPORT.FINAL DX SPEC: NORMAL
PATH REPORT.GROSS SPEC: NORMAL
PATH REPORT.MICROSCOPIC SPEC OTHER STN: NORMAL
PATH REPORT.RELEVANT HX SPEC: NORMAL
PHOTO IMAGE: NORMAL

## 2024-02-21 PROCEDURE — 258N000003 HC RX IP 258 OP 636: Performed by: SURGERY

## 2024-02-21 PROCEDURE — 250N000013 HC RX MED GY IP 250 OP 250 PS 637: Performed by: SURGERY

## 2024-02-21 PROCEDURE — 250N000011 HC RX IP 250 OP 636: Performed by: SURGERY

## 2024-02-21 RX ADMIN — POLYETHYLENE GLYCOL 3350 17 G: 17 POWDER, FOR SOLUTION ORAL at 08:14

## 2024-02-21 RX ADMIN — ACETAMINOPHEN 975 MG: 325 TABLET, FILM COATED ORAL at 00:29

## 2024-02-21 RX ADMIN — PIPERACILLIN AND TAZOBACTAM 3.38 G: 3; .375 INJECTION, POWDER, LYOPHILIZED, FOR SOLUTION INTRAVENOUS at 08:15

## 2024-02-21 RX ADMIN — SODIUM CHLORIDE, POTASSIUM CHLORIDE, SODIUM LACTATE AND CALCIUM CHLORIDE: 600; 310; 30; 20 INJECTION, SOLUTION INTRAVENOUS at 06:18

## 2024-02-21 RX ADMIN — SENNOSIDES AND DOCUSATE SODIUM 1 TABLET: 8.6; 5 TABLET ORAL at 08:14

## 2024-02-21 RX ADMIN — ACETAMINOPHEN 975 MG: 325 TABLET, FILM COATED ORAL at 08:14

## 2024-02-21 RX ADMIN — PIPERACILLIN AND TAZOBACTAM 3.38 G: 3; .375 INJECTION, POWDER, LYOPHILIZED, FOR SOLUTION INTRAVENOUS at 02:39

## 2024-02-21 ASSESSMENT — ACTIVITIES OF DAILY LIVING (ADL)
ADLS_ACUITY_SCORE: 18

## 2024-02-21 NOTE — PLAN OF CARE
Goal Outcome Evaluation:      Plan of Care Reviewed With: patient    Overall Patient Progress: improvingOverall Patient Progress: improving         VSS. Afebrile.   Pt rates pain with resting 0-3/10.  With movement, 7-8/10.  Tylenol given. Dilaudid x1. Using ice.  Bowel sounds hypoactive. Abdomen slightly rounded. Lap sites with glue, c/d/I.   Taking some water and apple juice, no nausea.   Voided x2.

## 2024-02-21 NOTE — PLAN OF CARE
Goal Outcome Evaluation:      Plan of Care Reviewed With: patient    Overall Patient Progress: improvingOverall Patient Progress: improving       VSS. Afebrile.   Pain well managed with tylenol.   Bowel sounds active. Lap sites c/d/I.   Tolerating small amount of regular diet. Drinking well. No nausea.   Voiding well.   Discharge instructions given. Home meds given and explained. Discharge home with .

## 2024-02-21 NOTE — PLAN OF CARE
Goal Outcome Evaluation:    Orientation: Alert and oriented x4  VSS. 99% on RA. afebrile.   LS: clear and equal bilaterally.   GI: Passing gas. no BM. Denies N/V. Hypoactive bowel sounds  : Adequate urine output.   Skin: 2 abdominal lap sites. Skin otherwise intact  Activity: Independent. Pt slept comfortably throughout shift.   Pain: 2/10 abdominal pain. Ice to abdomen for comfort.   Updates/Plan: Continue with current cares.

## 2024-02-21 NOTE — PROGRESS NOTES
[facility]   General Surgery Progress Note           Assessment and Plan:   Assessment:   1 Day Post-Op  s/p Procedure(s):  APPENDECTOMY, LAPAROSCOPIC  Perforated appendicitis      Plan:   -advance to regular diet  -plan to discharge today with 4 days of oral abx. Declines pain med.  -surgery PA will follow up by telephone 2-3 weeks  -follow up with OB/GYN 2 weeks         Interval History:   Afebrile. Pain controlled with ice and tylenol. Tolerating clear liquid diet and passing flatus. Up to bathroom ok and voiding normally. Feels ready for home.         Physical Exam:   Blood pressure (!) 83/65, pulse 71, temperature 98.6  F (37  C), temperature source Oral, resp. rate 12, weight 54 kg (119 lb), SpO2 99%.    I/O last 3 completed shifts:  In: 1701 [I.V.:1701]  Out: 1055 [Urine:1055]    Abdomen:   soft, non-distended, tenderness noted in the right lower quadrant    Inc(s) - clean, dry, intact                Data:     Recent Labs   Lab 02/20/24  0639   WBC 13.8*   HGB 12.2   HCT 35.8   MCV 87          Kiesha Luke PA-C  Text page: [phone] 8 am to 4 pm  After 4 pm, call [phone]     p

## 2024-02-21 NOTE — PHARMACY-ADMISSION MEDICATION HISTORY
Pharmacist Admission Medication History    Admission medication history is complete. The information provided in this note is only as accurate as the sources available at the time of the update.    Information Source(s): Patient and CareEverywhere/SureScripts via in-person    Pertinent Information: Pt denies using Rx medications PTA. Endorses use of some supplements but did not disclose which kinds.     Changes made to PTA medication list:  Added: None  Deleted: None  Changed: None    Allergies reviewed with patient and updates made in EHR: yes    Medication History Completed By: Jodie Curry RPH 2/21/2024 8:35 AM    Prior to Admission medications    Not on File

## 2024-02-21 NOTE — DISCHARGE INSTRUCTIONS
Dr. Kasey Bains   [facility], located within [facility] [place] [address], [address], [place] [address]    Phone: [phone] - Call to make an appointment      HOME CARE FOLLOWING APPENDECTOMY  TRI Mcdowell, JOSELINE aBll C. Pratt, MARTINA Perry    INCISIONAL CARE:  Replace the bandage over your incision(s) until all drainage stops, or if more comfortable to have in place.  If present, leave the steri-strips (white paper tapes) in place for 14 days after surgery.  If Dermabond (a type of skin glue) is present, leave in place until it wears/flakes off (2-3 weeks).     BATHING:  OK to shower 48 hours after surgery.  Avoid baths for 1 week after surgery.  You may wash your hair at any time.  Gently pat your incision dry after bathing.  Do not apply lotions, creams, or ointments to incisions.    ACTIVITY:  Light Activity -- you may immediately be up and about as tolerated.  Walking is encouraged, increase as tolerated.  Driving/Light Work-- when comfortable and off narcotic pain medications.  Strenuous Work/Activity -- limit lifting to 20 pounds for 2 weeks.  Progressively increase with time.  Active Sports (running, biking, etc.) -- cautiously resume after 2 weeks.    DISCOMFORT:  Local anesthetic placed at surgery should provide relief for 4-8 hours.  Begin taking pain pills before discomfort is severe.  Take the pain medication with some food, when possible, to minimize side effects.  Intermittent use of ice packs may help during the first 1-3 weeks after surgery.  Expect gradual improvement.    Over-the-counter anti-inflammatory medications (i.e. Ibuprofen/Advil/Motrin or Naprosyn/Aleve) may be used per package instructions in addition to or while tapering off the narcotic pain medications to decrease swelling and sensitivity.  DO NOT TAKE these Anti-inflammatory medications if your primary physician has advised against doing so, or if you have acid reflux, ulcer, or  "bleeding disorder, or take blood-thinner medications.  Call your primary physician or the surgery office if you have medication questions.  You may have decreased energy level for 1-2 weeks after surgery related to your recovery.    DIET:  Start with liquids and gradually resume your regular diet as tolerated.  Consider eating yogurt or taking a probiotic to help your \"gut yadira\" (good bacteria in the bowel/colon) return to normal while taking or after receiving antibiotics.  Drink plenty of fluids.  While taking pain medications, consider use of a stool softener, increase your fiber in your diet, or add a fiber supplement (like Metamucil, Citrucel) to help prevent constipation - a possible side effect of pain medications.    NAUSEA:  If nauseated from the anesthetic/pain meds; rest in bed, get up cautiously with assistance, and drink clear liquids (juice, tea, broth).    FOLLOW-UP AFTER SURGERY:  -Our office will contact you approximately 2-3 weeks after surgery to check on your progress and answer any questions you may have.  If you are doing well, you will not need to return for an office appointment.  If any concerns are identified over the phone, we will help you make an appointment to see a provider.    -If you have not received a phone call, have any questions or concerns, or would like to be seen, please call us at [phone].  We are located at: [address] [facility], [address]; [place] [address]    -CONTACT US IF THE FOLLOWING DEVELOPS:   1. A fever that is above 101     2. Increased redness, warmth, drainage, bleeding, or swelling.   3. Pain that is not relieved by rest/ice and your prescription.   4.  Increasing pain after 48 hours.   5. Drainage that is thick, cloudy, yellow, green or white.   6. Any other questions or concerns.      FREQUENTLY ASKED QUESTIONS:    Q:  How should my incision look?    A:  Normally your incision will appear slightly swollen with light redness directly along the incision " itself as it heals.  It may feel like a bump or ridge as the healing/scarring happens, and over time (3-4 months) this bump or ridge feeling should slowly go away.  In general, clear or pink watery drainage can be normal at first as your incision heals, but should decrease over time.    Q:  How do I know if my incision is infected?  A:  Look at your incision for signs of infection, like redness around the incision spreading to surrounding skin, or drainage of cloudy or foul-smelling drainage.  If you feel warm, check your temperature to see if you are running a fever.    **If any of these things occur, please notify the nurse at our office.  We may need you to come into the office for an incision check.      Q:  How do I take care of my incision?  A:  If you have a dressing in place - Starting the day after surgery, replace the dressing 1-2 times a day until there is no further drainage from the incision.  At that time, a dressing is no longer needed.  Try to minimize tape on the skin if irritation is occurring at the tape sites.  If you have significant irritation from tape on the skin, please call the office to discuss other method of dressing your incision.    Small pieces of tape called  steri-strips  may be present directly overlying your incision; these may be removed 10 days after surgery unless otherwise specified by your surgeon.  If these tapes start to loosen at the ends, you may trim them back until they fall off or are removed.    A:  If you had  Dermabond  tissue glue used as a dressing (this causes your incision to look shiny with a clear covering over it) - This type of dressing wears off with time and does not require more dressings over the top unless it is draining around the glue as it wears off.  Do not apply ointments or lotions over the incisions until the glue has completely worn off.    Q:  There is a piece of tape or a sticky  lead  still on my skin.  Can I remove this?  A:  Sometimes the  sticky  leads  used for monitoring during surgery or for evaluation in the emergency department are not all removed while you are in the hospital.  These sometimes have a tab or metal dot on them.  You can easily remove these on your own, like taking off a band-aid.  If there is a gel substance under the  lead , simply wipe/clean it off with a washcloth or paper towel.      Q:  What can I do to minimize constipation (very hard stools, or lack of stools)?  A:  Stay well hydrated.  Increase your dietary fiber intake or take a fiber supplement -with plenty of water.  Walk around frequently.  You may consider an over-the-counter stool-softener.  Your Pharmacist can assist you with choosing one that is stocked at your pharmacy.  Constipation is also one of the most common side effects of pain medication.  If you are using pain medication, be pro-active and try to PREVENT problems with constipation by taking the steps above BEFORE constipation becomes a problem.    Q:  What do I do if I need more pain medications?  A:  Call the office to receive refills.  Be aware that certain pain meds cannot be called into a pharmacy and actually require a paper prescription.  A change may be made in your pain med as you progress thru your recovery period or if you have side effects to certain meds.    --Pain meds are NOT refilled after 5pm on weekdays, and NOT AT ALL on the weekends, so please look ahead to prevent problems.    Q:  Why am I having a hard time sleeping now that I am at home?  A:  Many medications you receive while you are in the hospital can impact your sleep for a number of days after your surgery/hospitalization.  Decreased level of activity and naps during the day may also make sleeping at night difficult.  Try to minimize day-time naps, and get up frequently during the day to walk around your home during your recovery time.  Sleep aides may be of some help, but are not recommended for long-term use.      Q:  I am  having some back discomfort.  What should I do?  A:  This may be related to certain positioning that was required for your surgery, extended periods of time in bed, or other changes in your overall activity level.  You may try ice, heat, acetaminophen, or ibuprofen to treat this temporarily.  Note that many pain medications have acetaminophen in them and would state this on the prescription bottle.  Be sure not to exceed the maximum of 4000mg per day of acetaminophen.     **If the pain you are having does not resolve, is severe, or is a flare of back pain you have had on other occasions prior to surgery, please contact your primary physician for further recommendations or for an appointment to be examined at their office.    Q:  Why am I having headaches?  A:  Headaches can be caused by many things:  caffeine withdrawal, use of pain meds, dehydration, high blood pressure, lack of sleep, over-activity/exhaustion, flare-up of usual migraine headaches.  If you feel this is related to muscle tension (a band-like feeling around the head, or a pressure at the low-back of the head) you may try ice or heat to this area.  You may need to drink more fluids (try electrolyte drink like Gatorade), rest, or take your usual migraine medications.   **If your headaches do not resolve, worsen, are accompanied by other symptoms, or if your blood pressure is high, please call your primary physician for recommendation and/or examination.    Q:  I am unable to urinate.  What do I do?  A:  A small percentage of people can have difficulty urinating initially after surgery.  This includes being able to urinate only a very small amount at a time and feeling discomfort or pressure in the very low abdomen.  This is called  urinary retention , and is actually an urgent situation.  Proceed to your nearest Emergency department for evaluation (not an Urgent Care Center).  Sometimes the bladder does not work correctly after certain medications you  receive during surgery, or related to certain procedures.  You may need to have a catheter placed until your bladder recovers.  When planning to go to an Emergency department, it may help to call the ER to let them know you are coming in for this problem after a surgery.  This may help you get in quicker to be evaluated.  **If you have symptoms of a urinary tract infection, please contact your primary physician for the proper evaluation and treatment.        If you have other questions, please call the office Monday thru Friday between 8am and 4:30pm to discuss with the Nurse or Physician Assistant.  #[phone]    There is a surgeon ON CALL on weekday evenings and over the weekend in case of urgent need only, and may be contacted at the same number.    If you are having an emergency, call 911 or proceed to your nearest emergency department.

## 2024-02-21 NOTE — PLAN OF CARE
Goal Outcome Evaluation:    Vital Signs: A&O. VSS on RA. Afebrile.  Pain/Comfort: pain 9/10. Given IV dilaudid with relief.  Assessment: LS clear bilaterally. Lap sites c/d/I. Abdominal discomfort. Denies N/V. Using ice. BS hypoactive.  Diet: tolerating clears.  Output: adequate output. No BM  Activity/Ambulation: ambulating to bathroom independently. Ambulating in lehman x1.  Plan: Continue with pain management. Continue with IV abx.

## 2024-02-21 NOTE — UTILIZATION REVIEW
"  Admission Status; Secondary Review Determination         Under the authority of the Utilization Management Committee, the utilization review process indicated a secondary review on the above patient.  The review outcome is based on review of the medical records, discussions with staff, and applying clinical experience noted on the date of the review.        (xxx)      Inpatient Status Appropriate - This patient's medical care is consistent with medical management for inpatient care and reasonable inpatient medical practice.      () Observation Status Appropriate - This patient does not meet hospital inpatient criteria and is placed in observation status. If this patient's primary payer is Medicare and was admitted as an inpatient, Condition Code 44 should be used and patient status changed to \"observation\".   () Admission Status NOT Appropriate - This patient's medical care is not consistent with medical management for Inpatient or Observation Status.          RATIONALE FOR DETERMINATION     Heather Ford is a 33 year old female who is 5 weeks pregnant and was admitted on 2/20/2024 with 5 days of RLQ pain, anorexia, and fever (100).  She underwent a laparoscopic appendectomy and a small abscess was noted intraoperatively.  She received IV antibiotics, IV pain medication, IVF, and close clinical monitoring.  She has done well and will discharge today with plan to complete course of oral antibiotics.  IP status is appropriate.    The severity of illness, intensity of service provided, expected LOS and risk for adverse outcome make the care complex, high risk and appropriate for hospital admission.        The information on this document is developed by the utilization review team in order for the business office to ensure compliance.  This only denotes the appropriateness of proper admission status and does not reflect the quality of care rendered.         The definitions of Inpatient Status and Observation Status " used in making the determination above are those provided in the CMS Coverage Manual, Chapter 1 and Chapter 6, section 70.4.      Sincerely,     Mercy Bush MD  Physician Advisor   Utilization Review/ Case Management  [facility].

## 2024-02-24 NOTE — CONSULTS
[facility]    Consult Note  Obstetrics and Gynecology     Date of Admission:  2024  Date of Service (when I saw the patient): 2024    Assessment & Plan   Heather Ford is a 33 year old female  who presents to the ED with early pregnancy and suspicion for acute appendicitis.  Plans are for laparoscopic appendectomy.    -Discussed that appendicitis in pregnancy increases the risk of miscarriage and is obviously dangerous to her.  I am in agreement with general surgery that laparoscopic appendectomy in an expeditious manner is the best plan of action, despite her early intrauterine gestation.  I reviewed with her the findings on ultrasound, which did show a very small crown-rump length but with no detectable fetal heart rate, which is entirely normal at this gestation.    I have no concerns about planned antibiotics.  We discussed the general anesthetic at this point in pregnancy has been associated in some studies with an increased risk of miscarriage, but that is completely overshadowed by the risk of acute appendicitis.  General anesthetic in pregnancy is also thought to be an all or nothing phenomenon, which will either result in no effects or miscarriage.  In other words, she does not need to be concerned about an increased risk of birth defects related to general anesthesia.    After this discussion they stated that all of their questions were answered and the plan is to proceed as indicated with laparoscopic appendectomy.    They have not yet established prenatal care in the area.  I would suggest either a follow-up ultrasound and visit in our clinic within 1 to 2 weeks for a follow-up visit at her prior OB clinic, after which she can make plans to transfer care to this area where they now live.    I will ask our clinic to contact her to set up a follow-up ultrasound and appointment.      Kasey Bains MD    Primary Care Physician   Physician No Ref-Primary    Reason for  Consult   Reason for consult: I was asked by the emergency department physician and the general surgeon to evaluate this patient for need for laparoscopic appendectomy in early pregnancy.    Chief Complaint   Right lower quadrant pain in early pregnancy    History is obtained from the patient    History of Present Illness   Heather Ford is a 33 year old female  who presents with right lower quadrant pain in early pregnancy.  Imaging, labs, and exam are suspicious for acute appendicitis.    Past Medical History    I have reviewed this patient's medical history and updated it with pertinent information if needed.   History reviewed. No pertinent past medical history.    Past Surgical History   I have reviewed this patient's surgical history and updated it with pertinent information if needed.      Prior to Admission Medications   None     Allergies   No Known Allergies    Social History   I have reviewed this patient's social history and updated it with pertinent information if needed. Heather Ford  reports that she has never smoked. She has never used smokeless tobacco.    Family History   I have reviewed this patient's family history and updated it with pertinent information if needed.   History reviewed. No pertinent family history.    Review of Systems   The 5 point Review of Systems is negative other than noted in the HPI or here.     Physical Exam                      Vital Signs with Ranges     119 lbs 0 oz    Constitutional: awake, alert, cooperative, no apparent distress, and appears stated age  Respiratory: no increased work of breathing  CNeuropsychiatric: General: normal, calm, and normal eye contact  Level of consciousness: alert / normal  Affect: normal and pleasant    Data   No results found for this or any previous visit (from the past 24 hour(s)).

## 2024-02-26 DIAGNOSIS — Z34.90 EARLY STAGE OF PREGNANCY: Primary | ICD-10-CM

## 2024-02-27 NOTE — DISCHARGE SUMMARY
Surgery Discharge Summary    Heather Ford MRN# [id number]   YOB: 1990 Age: 33 year old     Date of Admission:  2/20/2024  Date of Discharge:  2/21/2024  1:30 PM  Admitting Physician:  Eliseo Sinha MD  Discharging Service:  General Surgery   Primary Provider: No Ref-Primary, Physician    Discharge Diagnosis:   Principle Diagnosis:  Acute perforated appendicitis with localized peritonitis    Secondary Diagnosis:  S/p laparoscopic appendectomy  5 weeks gestation    Brief HPI: Heather Ford is a 33 year old year-old female who is experiencing acute RLQ and periumbilical abdominal pain for the past 5 days associated with fever (100) and anorexia.  Negative for associated vomiting. She is five weeks pregnant (fifth pregnancy). She has not had prior abdominal surgery. The patient's history, physical exam, laboratory and imaging studies are suspicious for acute appendicitis. I have offered the patient a laparoscopic appendectomy. She was consented and agreed to proceed.       Hospital Course: Heather Ford underwent laparoscopic appendectomy without complications. Please see op note for further details. The patient had a routine hospital course and recovered as anticipated. By POD #1, she had advanced to a regular diet and was transitioned successfully to oral pain medications. She was  ambulating independently and voiding spontaneously. By day of discharge, she remained afebrile, was tolerating an oral diet, had adequate pain control on oral medications and was ambulating independently thus medically appropriate for discharge to home. She will follow-up with OB/Gyn in 2-3 weeks and we will call her for routine postoperative check in 2-3 weeks also. She was advised to call sooner with any questions or concerns.     Inpatient Consultations: No consultations were requested during this admission    Procedures:   Procedure(s):  APPENDECTOMY, LAPAROSCOPIC     Disposition:   Discharged to home      Discharge Condition  Discharge condition: Stable   Discharge vitals: Blood pressure 100/55, pulse 71, temperature 98.3  F (36.8  C), resp. rate 12, weight 54 kg (119 lb), SpO2 99%.     Discharge Medications:   Discharge Medication List as of 2/21/2024 11:53 AM        START taking these medications    Details   amoxicillin-clavulanate (AUGMENTIN) 875-125 MG tablet Take 1 tablet by mouth 2 times daily for 4 days, Disp-8 tablet, R-0, E-Prescribe             Discharge Instructions:     HOME CARE FOLLOWING APPENDECTOMY  Santos Gutierrez, TRI Carpenter, JOSELINE Ball, LIZ Andres, MARTINA Perry    INCISIONAL CARE:  Replace the bandage over your incision(s) until all drainage stops, or if more comfortable to have in place.  If present, leave the steri-strips (white paper tapes) in place for 14 days after surgery.  If Dermabond (a type of skin glue) is present, leave in place until it wears/flakes off (2-3 weeks).     BATHING:  OK to shower 48 hours after surgery.  Avoid baths for 1 week after surgery.  You may wash your hair at any time.  Gently pat your incision dry after bathing.  Do not apply lotions, creams, or ointments to incisions.    ACTIVITY:  Light Activity -- you may immediately be up and about as tolerated.  Walking is encouraged, increase as tolerated.  Driving/Light Work-- when comfortable and off narcotic pain medications.  Strenuous Work/Activity -- limit lifting to 20 pounds for 2 weeks.  Progressively increase with time.  Active Sports (running, biking, etc.) -- cautiously resume after 2 weeks.    DISCOMFORT:  Local anesthetic placed at surgery should provide relief for 4-8 hours.  Begin taking pain pills before discomfort is severe.  Take the pain medication with some food, when possible, to minimize side effects.  Intermittent use of ice packs may help during the first 1-3 weeks after surgery.  Expect gradual improvement.    Over-the-counter anti-inflammatory medications (i.e. Ibuprofen/Advil/Motrin  "or Naprosyn/Aleve) may be used per package instructions in addition to or while tapering off the narcotic pain medications to decrease swelling and sensitivity.  DO NOT TAKE these Anti-inflammatory medications if your primary physician has advised against doing so, or if you have acid reflux, ulcer, or bleeding disorder, or take blood-thinner medications.  Call your primary physician or the surgery office if you have medication questions.  You may have decreased energy level for 1-2 weeks after surgery related to your recovery.    DIET:  Start with liquids and gradually resume your regular diet as tolerated.  Consider eating yogurt or taking a probiotic to help your \"gut yadira\" (good bacteria in the bowel/colon) return to normal while taking or after receiving antibiotics.  Drink plenty of fluids.  While taking pain medications, consider use of a stool softener, increase your fiber in your diet, or add a fiber supplement (like Metamucil, Citrucel) to help prevent constipation - a possible side effect of pain medications.    NAUSEA:  If nauseated from the anesthetic/pain meds; rest in bed, get up cautiously with assistance, and drink clear liquids (juice, tea, broth).    FOLLOW-UP AFTER SURGERY:  -Our office will contact you approximately 2-3 weeks after surgery to check on your progress and answer any questions you may have.  If you are doing well, you will not need to return for an office appointment.  If any concerns are identified over the phone, we will help you make an appointment to see a provider.    -If you have not received a phone call, have any questions or concerns, or would like to be seen, please call us at [phone].  We are located at: [address] [facility], [address]; [place] [address]    -CONTACT US IF THE FOLLOWING DEVELOPS:   1. A fever that is above 101     2. Increased redness, warmth, drainage, bleeding, or swelling.   3. Pain that is not relieved by rest/ice and your prescription.   4. "  Increasing pain after 48 hours.   5. Drainage that is thick, cloudy, yellow, green or white.   6. Any other questions or concerns.          I did not personally see or examine the patient on the day of discharge.  Summary was completed by chart review.   Davon Vaughan PA-C

## 2024-02-28 ENCOUNTER — VIRTUAL VISIT (OUTPATIENT)
Dept: OBGYN | Facility: CLINIC | Age: 34
End: 2024-02-28
Payer: COMMERCIAL

## 2024-02-28 DIAGNOSIS — Z34.80 PRENATAL CARE, SUBSEQUENT PREGNANCY, UNSPECIFIED TRIMESTER: Primary | ICD-10-CM

## 2024-02-28 PROBLEM — Z86.32 HISTORY OF GESTATIONAL DIABETES MELLITUS: Status: ACTIVE | Noted: 2024-02-28

## 2024-02-28 PROCEDURE — 99207 PR NO CHARGE NURSE ONLY: CPT | Mod: 93

## 2024-02-28 RX ORDER — ALBUTEROL SULFATE 90 UG/1
1-2 AEROSOL, METERED RESPIRATORY (INHALATION)
COMMUNITY
Start: 2022-11-23

## 2024-02-28 NOTE — PROGRESS NOTES
NPN nurse visit done over the phone. Pt will be given NPN folder and book at her upcoming appt.   Discussed optional screening available to assess chromosomal anomalies. Questions answered. Pt advised to call the clinic if she has any questions or concerns related to her pregnancy. Prenatal labs will be obtained at her upcoming appt. New prenatal visit scheduled on 3/22/4 with Dr Ricketts.    6w3d    Menstrual cycles: q 4 weeks    Last pap: unable to locate in Care Everywhere        Patient supplied answers from flow sheet for:  Prenatal OB Questionnaire.  Past Medical History  Have you ever recieved care for your mental health? : No  Have you ever been in a major accident or suffered serious trauma?: No  Within the last year, has anyone hit, slapped, kicked or otherwise hurt you?: No  In the last year, has anyone forced you to have sex when you didn't want to?: No    Past Medical History 2   Have you ever received a blood transfusion?: No  Would you accept a blood transfusion if was medically recommended?: Yes  Does anyone in your home smoke?: No   Is your blood type Rh negative?: No  Have you ever ?: (!) Yes  Have you been hospitalized for a nonsurgical reason excluding normal delivery?: No  Have you ever had an abnormal pap smear?: No    Past Medical History (Continued)  Do you have a history of abnormalities of the uterus?: No  Did your mother take MARC or any other hormones when she was pregnant with you?: No  Do you have any other problems we have not asked about which you feel may be important to this pregnancy?: No

## 2024-03-08 ENCOUNTER — OFFICE VISIT (OUTPATIENT)
Dept: OBGYN | Facility: CLINIC | Age: 34
End: 2024-03-08
Payer: COMMERCIAL

## 2024-03-08 ENCOUNTER — ANCILLARY PROCEDURE (OUTPATIENT)
Dept: ULTRASOUND IMAGING | Facility: CLINIC | Age: 34
End: 2024-03-08
Attending: OBSTETRICS & GYNECOLOGY
Payer: COMMERCIAL

## 2024-03-08 VITALS — SYSTOLIC BLOOD PRESSURE: 92 MMHG | DIASTOLIC BLOOD PRESSURE: 60 MMHG | WEIGHT: 125.3 LBS

## 2024-03-08 DIAGNOSIS — Z34.90 EARLY STAGE OF PREGNANCY: ICD-10-CM

## 2024-03-08 DIAGNOSIS — Z34.90 EARLY STAGE OF PREGNANCY: Primary | ICD-10-CM

## 2024-03-08 PROCEDURE — 76801 OB US < 14 WKS SINGLE FETUS: CPT | Performed by: OBSTETRICS & GYNECOLOGY

## 2024-03-08 PROCEDURE — 99202 OFFICE O/P NEW SF 15 MIN: CPT | Mod: 24 | Performed by: OBSTETRICS & GYNECOLOGY

## 2024-03-08 RX ORDER — LANOLIN ALCOHOL/MO/W.PET/CERES
400 CREAM (GRAM) TOPICAL DAILY
COMMUNITY

## 2024-03-08 RX ORDER — DOCUSATE SODIUM 100 MG/1
100 CAPSULE, LIQUID FILLED ORAL 2 TIMES DAILY
COMMUNITY

## 2024-03-08 NOTE — PROGRESS NOTES
Chief Complaint   Patient presents with    Prenatal Care     Discuss ultrasound results       Subjective:  33-year-old  5 para 4 with last menstrual period 2024 placing her at approximately 8 weeks gestation presents for follow-up of ultrasound performed earlier today.    She had had an ultrasound done on  which showed a 5-week gestation with out confirmation of viability due to early gestation.  This was done in the context of evaluation for an acute appendicitis.    Her ultrasound this morning shows a viable intrauterine pregnancy with normal cardiac activity measuring approximately 8 weeks gestation.  The formal reading is pending.  Images were reviewed with the patient and her  and they are very relieved.      REVIEW OF SYSTEMS:  General: negative    Health Maintenance   Topic Date Due    YEARLY PREVENTIVE VISIT  Never done    ADVANCE CARE PLANNING  Never done    COVID-19 Vaccine (1) Never done    Pneumococcal Vaccine: Pediatrics (0 to 5 Years) and At-Risk Patients (6 to 64 Years) (1 of 2 - PCV) Never done    HIV SCREENING  Never done    HEPATITIS C SCREENING  Never done    HEPATITIS B IMMUNIZATION (1 of 3 - 19+ 3-dose series) Never done    PAP  Never done    INFLUENZA VACCINE (1) Never done    DTAP/TDAP/TD IMMUNIZATION (2 - Td or Tdap) 2023    MATERNAL SCREENING DISCUSSION  2024    RSV VACCINE (Pregnancy & 60+) (1 - Risk pregnant 1-dose series) 2024    PHQ-2 (once per calendar year)  Completed    IPV IMMUNIZATION  Aged Out    HPV IMMUNIZATION  Aged Out    MENINGITIS IMMUNIZATION  Aged Out    RSV MONOCLONAL ANTIBODY  Aged Out       No Known Allergies    Objective:  Vitals: BP 92/60   Wt 56.8 kg (125 lb 4.8 oz)   LMP 2024 (Exact Date)   BMI= There is no height or weight on file to calculate BMI.    Examination today deferred  Assessment/Plan:  1. Early stage of pregnancy  Viable intrauterine pregnancy confirmed by today's ultrasound.  Patient has a new OB  appointment scheduled for March 22        Tanvir Ricketts MD

## 2024-03-08 NOTE — NURSING NOTE
Chief Complaint   Patient presents with    Prenatal Care     Discuss ultrasound results     initial Wt 56.8 kg (125 lb 4.8 oz)   LMP 01/14/2024 (Exact Date)  There is no height or weight on file to calculate BMI.  BP completed using cuff size regular.  Cierra Riggins CMA

## 2024-03-10 ENCOUNTER — HEALTH MAINTENANCE LETTER (OUTPATIENT)
Age: 34
End: 2024-03-10

## 2024-03-12 ENCOUNTER — TELEPHONE (OUTPATIENT)
Dept: SURGERY | Facility: CLINIC | Age: 34
End: 2024-03-12
Payer: COMMERCIAL

## 2024-03-12 NOTE — CONFIDENTIAL NOTE
Attempted to call patient for post op check.  No answer.  Message was left for patient to call back if they had any questions of concerns.     Kiesha Luke PA-C

## 2024-05-07 DIAGNOSIS — Z34.80 PRENATAL CARE, SUBSEQUENT PREGNANCY, UNSPECIFIED TRIMESTER: Primary | ICD-10-CM

## 2024-06-12 ENCOUNTER — ANCILLARY PROCEDURE (OUTPATIENT)
Dept: ULTRASOUND IMAGING | Facility: CLINIC | Age: 34
End: 2024-06-12
Attending: OBSTETRICS & GYNECOLOGY
Payer: COMMERCIAL

## 2024-06-12 DIAGNOSIS — Z34.80 PRENATAL CARE, SUBSEQUENT PREGNANCY, UNSPECIFIED TRIMESTER: ICD-10-CM

## 2024-06-12 PROCEDURE — 76805 OB US >/= 14 WKS SNGL FETUS: CPT | Performed by: OBSTETRICS & GYNECOLOGY

## 2024-07-09 ENCOUNTER — PRENATAL OFFICE VISIT (OUTPATIENT)
Dept: OBGYN | Facility: CLINIC | Age: 34
End: 2024-07-09
Payer: COMMERCIAL

## 2024-07-09 VITALS
WEIGHT: 139.5 LBS | DIASTOLIC BLOOD PRESSURE: 60 MMHG | HEIGHT: 65 IN | SYSTOLIC BLOOD PRESSURE: 98 MMHG | BODY MASS INDEX: 23.24 KG/M2

## 2024-07-09 DIAGNOSIS — Z34.80 PRENATAL CARE, SUBSEQUENT PREGNANCY, UNSPECIFIED TRIMESTER: Primary | ICD-10-CM

## 2024-07-09 LAB
ERYTHROCYTE [DISTWIDTH] IN BLOOD BY AUTOMATED COUNT: 12.5 % (ref 10–15)
HCT VFR BLD AUTO: 34.7 % (ref 35–47)
HGB BLD-MCNC: 11.7 G/DL (ref 11.7–15.7)
MCH RBC QN AUTO: 29.3 PG (ref 26.5–33)
MCHC RBC AUTO-ENTMCNC: 33.7 G/DL (ref 31.5–36.5)
MCV RBC AUTO: 87 FL (ref 78–100)
PLATELET # BLD AUTO: 320 10E3/UL (ref 150–450)
RBC # BLD AUTO: 3.99 10E6/UL (ref 3.8–5.2)
RUBV IGG SERPL QL IA: 0.71 INDEX
RUBV IGG SERPL QL IA: NORMAL
T PALLIDUM AB SER QL: NONREACTIVE
WBC # BLD AUTO: 7.5 10E3/UL (ref 4–11)

## 2024-07-09 PROCEDURE — 85027 COMPLETE CBC AUTOMATED: CPT | Performed by: OBSTETRICS & GYNECOLOGY

## 2024-07-09 PROCEDURE — 86780 TREPONEMA PALLIDUM: CPT | Performed by: OBSTETRICS & GYNECOLOGY

## 2024-07-09 PROCEDURE — 87389 HIV-1 AG W/HIV-1&-2 AB AG IA: CPT | Performed by: OBSTETRICS & GYNECOLOGY

## 2024-07-09 PROCEDURE — 36415 COLL VENOUS BLD VENIPUNCTURE: CPT | Performed by: OBSTETRICS & GYNECOLOGY

## 2024-07-09 PROCEDURE — 87340 HEPATITIS B SURFACE AG IA: CPT | Performed by: OBSTETRICS & GYNECOLOGY

## 2024-07-09 PROCEDURE — 86762 RUBELLA ANTIBODY: CPT | Performed by: OBSTETRICS & GYNECOLOGY

## 2024-07-09 PROCEDURE — 99204 OFFICE O/P NEW MOD 45 MIN: CPT | Performed by: OBSTETRICS & GYNECOLOGY

## 2024-07-09 PROCEDURE — 87491 CHLMYD TRACH DNA AMP PROBE: CPT | Performed by: OBSTETRICS & GYNECOLOGY

## 2024-07-09 PROCEDURE — 87591 N.GONORRHOEAE DNA AMP PROB: CPT | Performed by: OBSTETRICS & GYNECOLOGY

## 2024-07-09 PROCEDURE — 86803 HEPATITIS C AB TEST: CPT | Performed by: OBSTETRICS & GYNECOLOGY

## 2024-07-09 NOTE — PROGRESS NOTES
New OB Visit  Kaye Stanton   2024   25w2d     Subjective: Kaye Stanton 34 year old  at 25w2d dated by LMP c/w 7w5d US, here today for initial OB visit.Estimated Date of Delivery: Oct 20, 2024. Patient reports feeling well overall. Denies cramping and vaginal spotting.   Doesn't drive while pregnant due to vertigo, creates difficulty scheduling appointments due to 4 children at home, all home schooled.     Complications of Pregnancy:  - late entry to PNC at 25w  - declining glucose testing due to inability to stay for the 1 hour it requires. Does have meter at home from 2 prior episodes of GDMA1, agrees to do 4 times per day finger pokes at home for 2 weeks.     Gyn History:   Menses: LMP: Patient's last menstrual period was 2024 (exact date). frequency: q month  Sexually transmitted disease history: none.    Occupation: [employer]  Exercise: very active, more so outside of pregnancy  Diet: regular  H/o Chicken Pox or Varicella Vaccination: Yes    + hx asthma, exercise induced and with viral infections.    History of GDM: yes, GDMA1 x2  Hx PTL : No   History of HTN in pregnancy: No   Shoulder dystocia: No   Vacuum Extraction: No   PPH: No   3rd of 4th degree laceration: No   Other complications: No    Since her last LMP she denies use of alcohol, tobacco and street drugs.    OBhx:  x 4  OB History    Para Term  AB Living   5 4 4 0 0 4   SAB IAB Ectopic Multiple Live Births   0 0 0 0 4      # Outcome Date GA Lbr Abdullahi/2nd Weight Sex Type Anes PTL Lv   5 Current            4 Term 21 40w3d 13:01 / 00:01 4.13 kg (9 lb 1.7 oz) M Vag-Spont   MILENA      Name: ДМИТРИЙ STANTON KAYE      Apgar1: 8  Apgar5: 9   3 Term 20 40w1d  3.67 kg (8 lb 1.5 oz) F Vag-Spont   MILENA      Name: MAXIMINOBG KAYE      Apgar1: 8  Apgar5: 9   2 Term 10/10/18 39w3d  3.544 kg (7 lb 13 oz) F Vag-Spont  N MILENA      Name: Kathryn Villalba      Apgar1: 9  Apgar5: 9   1 Term 10/18/16 41w0d  3.884 kg (8 lb 9 oz) M Vag-Spont  N  "MILENA      Name: Kalin      Apgar1: 9  Apgar5: 9       ROS: Ten point review of systems was reviewed and negative except the above.    HISTORY:  Past Medical History:   Diagnosis Date    Childhood asthma     still uses inhaler if sick    History of gestational diabetes     x2     Past Surgical History:   Procedure Laterality Date    LAPAROSCOPIC APPENDECTOMY N/A 2/20/2024    Procedure: APPENDECTOMY, LAPAROSCOPIC;  Surgeon: Eliseo Sinha MD;  Location: [facility] OR     Family History   Problem Relation Age of Onset    No Known Problems Mother     No Known Problems Father     Breast Cancer No family hx of      Social History     Socioeconomic History    Marital status:      Spouse name: None    Number of children: None    Years of education: None    Highest education level: None   Occupational History    Occupation: stay at home mom   Tobacco Use    Smoking status: Never    Smokeless tobacco: Never   Vaping Use    Vaping status: Never Used   Substance and Sexual Activity    Alcohol use: Not Currently    Drug use: Never    Sexual activity: Yes     Partners: Male     Social Determinants of Health      [facility]    Social Connections     Current Outpatient Medications   Medication Sig Dispense Refill    albuterol (PROAIR HFA/PROVENTIL HFA/VENTOLIN HFA) 108 (90 Base) MCG/ACT inhaler Inhale 1-2 puffs into the lungs      docusate sodium (COLACE) 100 MG capsule Take 100 mg by mouth 2 times daily      Nutritional Supplements (NUTRITIONAL SUPPLEMENT PO) Magnesium Glycinate      folic acid (FOLVITE) 400 MCG tablet Take 400 mcg by mouth daily (Patient not taking: Reported on 3/8/2024)       No current facility-administered medications for this visit.     No Known Allergies    Past medical, surgical, social and family history were reviewed and updated in [facility].      EXAM:  BP 98/60   Ht 1.651 m (5' 5\")   Wt 63.3 kg (139 lb 8 oz)  "  LMP 2024 (Exact Date)   BMI 23.21 kg/m       Gen:  no acute distress, comfortable  HENT: No scleral injection or icterus  CV: Regular rate and rhythm, no murmur  Resp: CTAB, Normal work of breathing, no cough  GI: Abdomen soft, non-tender. No masses, organomegaly  Skin: No suspicious lesions or rashes  Psychiatric: mentation appears normal and affect bright   Pap smear declined by pt        No lab results found.  Rhogam not indicated     No lab results found.    Treponemal antibody pending    CBC RESULTS:   Recent Labs   Lab Test 24  0639   WBC 13.8*   RBC 4.14   HGB 12.2   HCT 35.8   MCV 87   MCH 29.5   MCHC 34.1   RDW 12.9          ASSESSMENT:  34 year old  at 25w2d dated by 1st tri US c/w LMP here for NOB visit.    PLAN:    1)Prenatal labs reviewed. She has no questions.  2) EDUCATION : RECOMMENDED WEIGHT GAIN: 25-35 lbs given Body mass index is 23.21 kg/m ..   - Instructed on best evidence for: healthy diet and foods to avoid; exercise and activity during pregnancy; and maintenance of a generally healthy lifestyle. Reviewed early pregnancy education, provider coverage, labor and delivery, and prenatal visits.  Discussed the harms, benefits, side effects and alternative therapies for current prescribed and OTC medications.  - recommend PNV  3) Discussed options for screening for chromosomal anomalies, including first screen, noninvasive prenatal testing, CVS/amniocentesis, quad screen, and ultrasound at 18-20 weeks. She is electing ultrasound at 18-20 weeks and declines genetic testing.  4) Hx GDMA1: refuses 1 and 3 hour tests. Offered 2 hour test, but she does not feel that she can do this due to family demands. She has meter supplies at home, she will test for 2 weeks and then review results with me at next visit.     Follow up in 3 weeks. She is encouraged to call sooner with questions or concerns. Return to clinic in 2 weeks to review glucose testing.    Carmencita Eli MD    Obstetrics and Gynecology

## 2024-07-09 NOTE — NURSING NOTE
"25w2d    Chief Complaint   Patient presents with    Prenatal Care     Pap due--declines--GC due       Initial BP 98/60   Ht 1.651 m (5' 5\")   Wt 63.3 kg (139 lb 8 oz)   LMP 2024 (Exact Date)   BMI 23.21 kg/m   Estimated body mass index is 23.21 kg/m  as calculated from the following:    Height as of this encounter: 1.651 m (5' 5\").    Weight as of this encounter: 63.3 kg (139 lb 8 oz).  BP completed using cuff size: regular    Questioned patient about current smoking habits.  Pt. has never smoked.          The following  Due: NONE           "

## 2024-07-10 LAB
C TRACH DNA SPEC QL NAA+PROBE: NEGATIVE
HBV SURFACE AG SERPL QL IA: NONREACTIVE
HCV AB SERPL QL IA: NONREACTIVE
HIV 1+2 AB+HIV1 P24 AG SERPL QL IA: NONREACTIVE
N GONORRHOEA DNA SPEC QL NAA+PROBE: NEGATIVE

## 2024-07-11 ENCOUNTER — MYC MEDICAL ADVICE (OUTPATIENT)
Dept: OBGYN | Facility: CLINIC | Age: 34
End: 2024-07-11
Payer: COMMERCIAL

## 2024-07-11 DIAGNOSIS — O09.299 HISTORY OF GESTATIONAL DIABETES IN PRIOR PREGNANCY, CURRENTLY PREGNANT: Primary | ICD-10-CM

## 2024-07-11 DIAGNOSIS — Z86.32 HISTORY OF GESTATIONAL DIABETES IN PRIOR PREGNANCY, CURRENTLY PREGNANT: Primary | ICD-10-CM

## 2024-07-11 NOTE — TELEPHONE ENCOUNTER
Pt needing diabetic supplies.     See LOV 7/9/24 with Dr SEBASTIAN Eli-     Hx GDMA1: refuses 1 and 3 hour tests. Offered 2 hour test, but she does not feel that she can do this due to family demands. She has meter supplies at home, she will test for 2 weeks and then review results with me at next visit.      Follow up in 3 weeks. She is encouraged to call sooner with questions or concerns. Return to clinic in 2 weeks to review glucose testing.      Pt discovered some broken and outdated supplies.     In order not to delay care, diabetic supply order placed and pt notified via [employer].    Irene CORDNO RN

## 2024-07-29 ENCOUNTER — PRENATAL OFFICE VISIT (OUTPATIENT)
Dept: OBGYN | Facility: CLINIC | Age: 34
End: 2024-07-29
Payer: COMMERCIAL

## 2024-07-29 VITALS — SYSTOLIC BLOOD PRESSURE: 106 MMHG | WEIGHT: 141.8 LBS | BODY MASS INDEX: 23.6 KG/M2 | DIASTOLIC BLOOD PRESSURE: 58 MMHG

## 2024-07-29 DIAGNOSIS — Z34.80 PRENATAL CARE, SUBSEQUENT PREGNANCY, UNSPECIFIED TRIMESTER: ICD-10-CM

## 2024-07-29 DIAGNOSIS — Z86.32 HISTORY OF GESTATIONAL DIABETES IN PRIOR PREGNANCY, CURRENTLY PREGNANT: Primary | ICD-10-CM

## 2024-07-29 DIAGNOSIS — O09.299 HISTORY OF GESTATIONAL DIABETES IN PRIOR PREGNANCY, CURRENTLY PREGNANT: Primary | ICD-10-CM

## 2024-07-29 PROCEDURE — 99207 PR PRENATAL VISIT: CPT | Performed by: OBSTETRICS & GYNECOLOGY

## 2024-07-29 NOTE — NURSING NOTE
"Chief Complaint   Patient presents with    Prenatal Care     28 weeks 1 day. No c/o VB. LoF, cramping/contractions. Feeling FM daily    Blood Glucose (home Monitoring)     Patient does not have 2 weeks of data yet, but the numbers she gets from testing 4 times a day have been in range. Reports 1 high reading of 139 after eating Chinese chicken.     Imm/Inj     Declines tdap         Initial /58   Wt 64.3 kg (141 lb 12.8 oz)   LMP 2024 (Exact Date)   BMI 23.60 kg/m   Estimated body mass index is 23.6 kg/m  as calculated from the following:    Height as of 24: 1.651 m (5' 5\").    Weight as of this encounter: 64.3 kg (141 lb 12.8 oz).  BP completed using cuff size: regular    Questioned patient about current smoking habits.  Pt. has never smoked.          The following HM Due: NONE  Kimberly Kenney CMA               "

## 2024-07-29 NOTE — PROGRESS NOTES
IUP at 28w1d,      1) Routine PNC  - A+  - declines all vaccines  - declines GCT. She has tested for 1 week at home 4 times per day, all values within range. Will continue to tet 1 further week and report values.     Reviewed reportable s/sx.   Return to clinic 2w    Carmencita Eli MD

## 2024-08-15 ENCOUNTER — PRENATAL OFFICE VISIT (OUTPATIENT)
Dept: OBGYN | Facility: CLINIC | Age: 34
End: 2024-08-15
Payer: COMMERCIAL

## 2024-08-15 VITALS
BODY MASS INDEX: 24.16 KG/M2 | DIASTOLIC BLOOD PRESSURE: 60 MMHG | HEIGHT: 65 IN | WEIGHT: 145 LBS | SYSTOLIC BLOOD PRESSURE: 90 MMHG

## 2024-08-15 DIAGNOSIS — Z34.80 PRENATAL CARE, SUBSEQUENT PREGNANCY, UNSPECIFIED TRIMESTER: Primary | ICD-10-CM

## 2024-08-15 PROCEDURE — 99207 PR PRENATAL VISIT: CPT | Performed by: OBSTETRICS & GYNECOLOGY

## 2024-08-15 NOTE — PROGRESS NOTES
IUP at 30w4d,    Good FM, no contractions, no leakage of fluid, no vaginal bleeding.     1) Routine PNC  - A+  - declines all vaccines  - declines GCT. Performed 3 weeks of testing at home, all values in the last 2 weeks were within range. Ok to discontinue testing.    Reviewed reportable s/sx.   Return to clinic 2w    Carmencita Eli MD

## 2024-08-15 NOTE — NURSING NOTE
"Chief Complaint   Patient presents with    Prenatal Care     30 4/7 weeks       Initial BP 90/60 (BP Location: Left arm, Patient Position: Chair, Cuff Size: Adult Regular)   Ht 1.651 m (5' 5\")   Wt 65.8 kg (145 lb)   LMP 2024 (Exact Date)   Breastfeeding No   BMI 24.13 kg/m   Estimated body mass index is 24.13 kg/m  as calculated from the following:    Height as of this encounter: 1.651 m (5' 5\").    Weight as of this encounter: 65.8 kg (145 lb).  BP completed using cuff size: regular    Questioned patient about current smoking habits.  Pt. has never smoked.          The following HM Due: NONE  +fetal movement  Kerry Samayoa, CMA             "

## 2024-09-12 ENCOUNTER — PRENATAL OFFICE VISIT (OUTPATIENT)
Dept: OBGYN | Facility: CLINIC | Age: 34
End: 2024-09-12
Payer: COMMERCIAL

## 2024-09-12 VITALS — WEIGHT: 147.8 LBS | SYSTOLIC BLOOD PRESSURE: 110 MMHG | DIASTOLIC BLOOD PRESSURE: 62 MMHG | BODY MASS INDEX: 24.6 KG/M2

## 2024-09-12 DIAGNOSIS — Z34.80 PRENATAL CARE, SUBSEQUENT PREGNANCY, UNSPECIFIED TRIMESTER: Primary | ICD-10-CM

## 2024-09-12 PROCEDURE — 99207 PR PRENATAL VISIT: CPT | Performed by: OBSTETRICS & GYNECOLOGY

## 2024-09-12 NOTE — PROGRESS NOTES
IUP at 34w4d,    Good FM, no contractions, no leakage of fluid, no vaginal bleeding.     1) Routine PNC  - A+  - declines all vaccines  - declines GCT. Had 2 weeks of normal 4 qid testing at home.     2) S>D   - measuring 37cm at 34w, plan growth US    Reviewed reportable s/sx.   Return to clinic 2w    Carmencita Eli MD

## 2024-09-12 NOTE — NURSING NOTE
"Chief Complaint   Patient presents with    Prenatal Care     34 weeks 4 days. No c/o VB, LoF, cramping/contractions. Feeling FM daily.  No headaches,swelling,vision changes. No concerns today       Initial /62   Wt 67 kg (147 lb 12.8 oz)   LMP 2024 (Exact Date)   BMI 24.60 kg/m   Estimated body mass index is 24.6 kg/m  as calculated from the following:    Height as of 8/15/24: 1.651 m (5' 5\").    Weight as of this encounter: 67 kg (147 lb 12.8 oz).  BP completed using cuff size: regular    Questioned patient about current smoking habits.  Pt. has never smoked.          The following HM Due: NONE  Kimberly Kenney CMA               "

## 2024-09-26 ENCOUNTER — ANCILLARY PROCEDURE (OUTPATIENT)
Dept: ULTRASOUND IMAGING | Facility: CLINIC | Age: 34
End: 2024-09-26
Attending: OBSTETRICS & GYNECOLOGY
Payer: COMMERCIAL

## 2024-09-26 ENCOUNTER — PRENATAL OFFICE VISIT (OUTPATIENT)
Dept: OBGYN | Facility: CLINIC | Age: 34
End: 2024-09-26
Payer: COMMERCIAL

## 2024-09-26 VITALS — BODY MASS INDEX: 24.99 KG/M2 | SYSTOLIC BLOOD PRESSURE: 98 MMHG | WEIGHT: 150.2 LBS | DIASTOLIC BLOOD PRESSURE: 60 MMHG

## 2024-09-26 DIAGNOSIS — Z34.80 PRENATAL CARE, SUBSEQUENT PREGNANCY, UNSPECIFIED TRIMESTER: Primary | ICD-10-CM

## 2024-09-26 DIAGNOSIS — Z34.80 PRENATAL CARE, SUBSEQUENT PREGNANCY, UNSPECIFIED TRIMESTER: ICD-10-CM

## 2024-09-26 PROCEDURE — 87653 STREP B DNA AMP PROBE: CPT | Performed by: OBSTETRICS & GYNECOLOGY

## 2024-09-26 PROCEDURE — 99207 PR PRENATAL VISIT: CPT | Performed by: OBSTETRICS & GYNECOLOGY

## 2024-09-26 PROCEDURE — 76816 OB US FOLLOW-UP PER FETUS: CPT | Performed by: OBSTETRICS & GYNECOLOGY

## 2024-09-26 NOTE — PROGRESS NOTES
IUP at 36w4d,    Good FM, no contractions, no leakage of fluid, no vaginal bleeding.     1) Routine PNC  - A+  - declines all vaccines  - declines GCT. Had 2 weeks of normal 4 qid testing at home.   - GBS collected today    2) S>D   - 6lb 3oz, 36th%ile. Discussed US results today    Reviewed reportable s/sx.   Return to clinic 2w    Carmencita Eli MD

## 2024-09-26 NOTE — NURSING NOTE
"Chief Complaint   Patient presents with    Prenatal Care     36 weeks 4 days, no c/o VB, LoF, cramping/contractions. Feeling FM daily. Had growth US this morning. Declines vaccines- flu, rsv       Initial BP 98/60   Wt 68.1 kg (150 lb 3.2 oz)   LMP 2024 (Exact Date)   BMI 24.99 kg/m   Estimated body mass index is 24.99 kg/m  as calculated from the following:    Height as of 8/15/24: 1.651 m (5' 5\").    Weight as of this encounter: 68.1 kg (150 lb 3.2 oz).  BP completed using cuff size: regular    Questioned patient about current smoking habits.  Pt. has never smoked.          The following HM Due: NONE    Kimberly Kenney CMA               "

## 2024-09-27 LAB — GP B STREP DNA SPEC QL NAA+PROBE: NEGATIVE

## 2024-10-24 ENCOUNTER — PRENATAL OFFICE VISIT (OUTPATIENT)
Dept: OBGYN | Facility: CLINIC | Age: 34
End: 2024-10-24
Payer: COMMERCIAL

## 2024-10-24 ENCOUNTER — TELEPHONE (OUTPATIENT)
Dept: OBGYN | Facility: CLINIC | Age: 34
End: 2024-10-24

## 2024-10-24 VITALS
BODY MASS INDEX: 25.16 KG/M2 | HEIGHT: 65 IN | DIASTOLIC BLOOD PRESSURE: 70 MMHG | WEIGHT: 151 LBS | SYSTOLIC BLOOD PRESSURE: 112 MMHG

## 2024-10-24 DIAGNOSIS — Z34.80 PRENATAL CARE, SUBSEQUENT PREGNANCY, UNSPECIFIED TRIMESTER: Primary | ICD-10-CM

## 2024-10-24 PROCEDURE — 99207 PR PRENATAL VISIT: CPT | Performed by: OBSTETRICS & GYNECOLOGY

## 2024-10-24 NOTE — PROGRESS NOTES
IUP at 40w4d,    Good FM, no leakage of fluid, no vaginal bleeding.   Multiple episodes of contractions that she thought would lead to labor, all resolve spontaneously.    1) Routine PNC  - A+  - declines all vaccines  - declined GCT. Had 2 weeks of normal 4 qid testing at home.   - GBS negative    2) S>D   - 6lb 3oz, 36th%ile at 36w    Patient agrees to IOL at 41w. Discussed BPP this week, she reports that would be too challenging to arrange as she does not drive during pregnancy due to vertigo. She agrees instead to daily kick counts and morning IOL on 10/27.  - membranes stripped today    Reviewed reportable s/sx.   Scheduled IOL in 3 days.    Carmencita Eli MD

## 2024-10-24 NOTE — NURSING NOTE
"Chief Complaint   Patient presents with    Prenatal Care     40 4/7 weeks       Initial /70 (BP Location: Left arm, Patient Position: Chair, Cuff Size: Adult Regular)   Ht 1.651 m (5' 5\")   Wt 68.5 kg (151 lb)   LMP 2024 (Exact Date)   Breastfeeding No   BMI 25.13 kg/m   Estimated body mass index is 25.13 kg/m  as calculated from the following:    Height as of this encounter: 1.651 m (5' 5\").    Weight as of this encounter: 68.5 kg (151 lb).  BP completed using cuff size: regular    Questioned patient about current smoking habits.  Pt. has never smoked.          The following HM Due: NONE  +fetal movement  Kerry Samayoa, CMA    "

## 2024-10-24 NOTE — TELEPHONE ENCOUNTER
40w4d      Pt calling wanting to schedule induction.     Pt has not has an OB visit since .     Scheduled for return OB visit today with Dr Bains.    Pt denies any regular contractions, leaking of fluid, vaginal bleeding and has normal fetal movement.     Irene CORDON RN  [facility]

## 2024-10-25 ENCOUNTER — ANESTHESIA (OUTPATIENT)
Dept: OBGYN | Facility: CLINIC | Age: 34
End: 2024-10-25
Payer: COMMERCIAL

## 2024-10-25 ENCOUNTER — HOSPITAL ENCOUNTER (INPATIENT)
Facility: CLINIC | Age: 34
LOS: 1 days | Discharge: HOME | End: 2024-10-26
Attending: OBSTETRICS & GYNECOLOGY | Admitting: OBSTETRICS & GYNECOLOGY
Payer: COMMERCIAL

## 2024-10-25 ENCOUNTER — ANESTHESIA EVENT (OUTPATIENT)
Dept: OBGYN | Facility: CLINIC | Age: 34
End: 2024-10-25
Payer: COMMERCIAL

## 2024-10-25 LAB
ABO/RH(D): NORMAL
ANTIBODY SCREEN: NEGATIVE
HGB BLD-MCNC: 10.8 G/DL (ref 11.7–15.7)
HGB BLD-MCNC: 9.8 G/DL (ref 11.7–15.7)
SPECIMEN EXPIRATION DATE: NORMAL
T PALLIDUM AB SER QL: NONREACTIVE

## 2024-10-25 PROCEDURE — 3E0R3BZ INTRODUCTION OF ANESTHETIC AGENT INTO SPINAL CANAL, PERCUTANEOUS APPROACH: ICD-10-PCS | Performed by: ANESTHESIOLOGY

## 2024-10-25 PROCEDURE — 86901 BLOOD TYPING SEROLOGIC RH(D): CPT | Performed by: OBSTETRICS & GYNECOLOGY

## 2024-10-25 PROCEDURE — 36415 COLL VENOUS BLD VENIPUNCTURE: CPT | Performed by: OBSTETRICS & GYNECOLOGY

## 2024-10-25 PROCEDURE — G0463 HOSPITAL OUTPT CLINIC VISIT: HCPCS

## 2024-10-25 PROCEDURE — 250N000011 HC RX IP 250 OP 636: Mod: JZ | Performed by: ANESTHESIOLOGY

## 2024-10-25 PROCEDURE — 85018 HEMOGLOBIN: CPT | Performed by: OBSTETRICS & GYNECOLOGY

## 2024-10-25 PROCEDURE — 370N000003 HC ANESTHESIA WARD SERVICE: Performed by: ANESTHESIOLOGY

## 2024-10-25 PROCEDURE — 250N000011 HC RX IP 250 OP 636: Performed by: ANESTHESIOLOGY

## 2024-10-25 PROCEDURE — 86780 TREPONEMA PALLIDUM: CPT | Performed by: OBSTETRICS & GYNECOLOGY

## 2024-10-25 PROCEDURE — 00HU33Z INSERTION OF INFUSION DEVICE INTO SPINAL CANAL, PERCUTANEOUS APPROACH: ICD-10-PCS | Performed by: ANESTHESIOLOGY

## 2024-10-25 PROCEDURE — 59400 OBSTETRICAL CARE: CPT | Performed by: OBSTETRICS & GYNECOLOGY

## 2024-10-25 PROCEDURE — 258N000003 HC RX IP 258 OP 636: Performed by: OBSTETRICS & GYNECOLOGY

## 2024-10-25 PROCEDURE — 120N000013 HC R&B IMCU

## 2024-10-25 PROCEDURE — 250N000011 HC RX IP 250 OP 636: Performed by: OBSTETRICS & GYNECOLOGY

## 2024-10-25 PROCEDURE — 722N000001 HC LABOR CARE VAGINAL DELIVERY SINGLE

## 2024-10-25 PROCEDURE — 250N000013 HC RX MED GY IP 250 OP 250 PS 637: Performed by: OBSTETRICS & GYNECOLOGY

## 2024-10-25 PROCEDURE — 250N000009 HC RX 250: Performed by: OBSTETRICS & GYNECOLOGY

## 2024-10-25 PROCEDURE — 86900 BLOOD TYPING SEROLOGIC ABO: CPT | Performed by: OBSTETRICS & GYNECOLOGY

## 2024-10-25 RX ORDER — IBUPROFEN 800 MG/1
800 TABLET, FILM COATED ORAL EVERY 6 HOURS PRN
Status: DISCONTINUED | OUTPATIENT
Start: 2024-10-25 | End: 2024-10-26 | Stop reason: HOSPADM

## 2024-10-25 RX ORDER — ONDANSETRON 4 MG/1
4 TABLET, ORALLY DISINTEGRATING ORAL EVERY 6 HOURS PRN
Status: DISCONTINUED | OUTPATIENT
Start: 2024-10-25 | End: 2024-10-25 | Stop reason: HOSPADM

## 2024-10-25 RX ORDER — BISACODYL 10 MG
10 SUPPOSITORY, RECTAL RECTAL DAILY PRN
Status: DISCONTINUED | OUTPATIENT
Start: 2024-10-25 | End: 2024-10-26 | Stop reason: HOSPADM

## 2024-10-25 RX ORDER — LOPERAMIDE HYDROCHLORIDE 2 MG/1
4 CAPSULE ORAL
Status: DISCONTINUED | OUTPATIENT
Start: 2024-10-25 | End: 2024-10-25 | Stop reason: HOSPADM

## 2024-10-25 RX ORDER — FENTANYL/BUPIVACAINE/NS/PF 2-1250MCG
PLASTIC BAG, INJECTION (ML) INJECTION
Status: COMPLETED
Start: 2024-10-25 | End: 2024-10-25

## 2024-10-25 RX ORDER — METOCLOPRAMIDE 10 MG/1
10 TABLET ORAL EVERY 6 HOURS PRN
Status: DISCONTINUED | OUTPATIENT
Start: 2024-10-25 | End: 2024-10-25 | Stop reason: HOSPADM

## 2024-10-25 RX ORDER — OXYTOCIN 10 [USP'U]/ML
10 INJECTION, SOLUTION INTRAMUSCULAR; INTRAVENOUS
Status: DISCONTINUED | OUTPATIENT
Start: 2024-10-25 | End: 2024-10-26 | Stop reason: HOSPADM

## 2024-10-25 RX ORDER — LIDOCAINE 40 MG/G
CREAM TOPICAL
Status: DISCONTINUED | OUTPATIENT
Start: 2024-10-25 | End: 2024-10-25 | Stop reason: HOSPADM

## 2024-10-25 RX ORDER — ONDANSETRON 2 MG/ML
4 INJECTION INTRAMUSCULAR; INTRAVENOUS EVERY 6 HOURS PRN
Status: DISCONTINUED | OUTPATIENT
Start: 2024-10-25 | End: 2024-10-25

## 2024-10-25 RX ORDER — METHYLERGONOVINE MALEATE 0.2 MG/ML
200 INJECTION INTRAVENOUS
Status: DISCONTINUED | OUTPATIENT
Start: 2024-10-25 | End: 2024-10-25 | Stop reason: HOSPADM

## 2024-10-25 RX ORDER — FENTANYL CITRATE 50 UG/ML
100 INJECTION, SOLUTION INTRAMUSCULAR; INTRAVENOUS
Status: DISCONTINUED | OUTPATIENT
Start: 2024-10-25 | End: 2024-10-25 | Stop reason: HOSPADM

## 2024-10-25 RX ORDER — OXYTOCIN 10 [USP'U]/ML
10 INJECTION, SOLUTION INTRAMUSCULAR; INTRAVENOUS
Status: DISCONTINUED | OUTPATIENT
Start: 2024-10-25 | End: 2024-10-25 | Stop reason: HOSPADM

## 2024-10-25 RX ORDER — NALOXONE HYDROCHLORIDE 0.4 MG/ML
0.4 INJECTION, SOLUTION INTRAMUSCULAR; INTRAVENOUS; SUBCUTANEOUS
Status: DISCONTINUED | OUTPATIENT
Start: 2024-10-25 | End: 2024-10-25 | Stop reason: HOSPADM

## 2024-10-25 RX ORDER — PROCHLORPERAZINE MALEATE 10 MG
10 TABLET ORAL EVERY 6 HOURS PRN
Status: DISCONTINUED | OUTPATIENT
Start: 2024-10-25 | End: 2024-10-25 | Stop reason: HOSPADM

## 2024-10-25 RX ORDER — TRANEXAMIC ACID 10 MG/ML
1 INJECTION, SOLUTION INTRAVENOUS EVERY 30 MIN PRN
Status: DISCONTINUED | OUTPATIENT
Start: 2024-10-25 | End: 2024-10-26 | Stop reason: HOSPADM

## 2024-10-25 RX ORDER — MISOPROSTOL 200 UG/1
400 TABLET ORAL
Status: DISCONTINUED | OUTPATIENT
Start: 2024-10-25 | End: 2024-10-25 | Stop reason: HOSPADM

## 2024-10-25 RX ORDER — LOPERAMIDE HYDROCHLORIDE 2 MG/1
2 CAPSULE ORAL
Status: DISCONTINUED | OUTPATIENT
Start: 2024-10-25 | End: 2024-10-26 | Stop reason: HOSPADM

## 2024-10-25 RX ORDER — CITRIC ACID/SODIUM CITRATE 334-500MG
30 SOLUTION, ORAL ORAL
Status: DISCONTINUED | OUTPATIENT
Start: 2024-10-25 | End: 2024-10-25 | Stop reason: HOSPADM

## 2024-10-25 RX ORDER — MISOPROSTOL 200 UG/1
800 TABLET ORAL
Status: DISCONTINUED | OUTPATIENT
Start: 2024-10-25 | End: 2024-10-26 | Stop reason: HOSPADM

## 2024-10-25 RX ORDER — MISOPROSTOL 200 UG/1
800 TABLET ORAL
Status: DISCONTINUED | OUTPATIENT
Start: 2024-10-25 | End: 2024-10-25 | Stop reason: HOSPADM

## 2024-10-25 RX ORDER — MODIFIED LANOLIN
OINTMENT (GRAM) TOPICAL
Status: DISCONTINUED | OUTPATIENT
Start: 2024-10-25 | End: 2024-10-26 | Stop reason: HOSPADM

## 2024-10-25 RX ORDER — DOCUSATE SODIUM 100 MG/1
100 CAPSULE, LIQUID FILLED ORAL DAILY
Status: DISCONTINUED | OUTPATIENT
Start: 2024-10-25 | End: 2024-10-26 | Stop reason: HOSPADM

## 2024-10-25 RX ORDER — NALOXONE HYDROCHLORIDE 0.4 MG/ML
0.2 INJECTION, SOLUTION INTRAMUSCULAR; INTRAVENOUS; SUBCUTANEOUS
Status: DISCONTINUED | OUTPATIENT
Start: 2024-10-25 | End: 2024-10-26 | Stop reason: HOSPADM

## 2024-10-25 RX ORDER — OXYTOCIN/0.9 % SODIUM CHLORIDE 30/500 ML
340 PLASTIC BAG, INJECTION (ML) INTRAVENOUS CONTINUOUS PRN
Status: DISCONTINUED | OUTPATIENT
Start: 2024-10-25 | End: 2024-10-26 | Stop reason: HOSPADM

## 2024-10-25 RX ORDER — ONDANSETRON 2 MG/ML
4 INJECTION INTRAMUSCULAR; INTRAVENOUS EVERY 6 HOURS PRN
Status: DISCONTINUED | OUTPATIENT
Start: 2024-10-25 | End: 2024-10-25 | Stop reason: HOSPADM

## 2024-10-25 RX ORDER — SODIUM CHLORIDE, SODIUM LACTATE, POTASSIUM CHLORIDE, CALCIUM CHLORIDE 600; 310; 30; 20 MG/100ML; MG/100ML; MG/100ML; MG/100ML
INJECTION, SOLUTION INTRAVENOUS CONTINUOUS
Status: DISCONTINUED | OUTPATIENT
Start: 2024-10-25 | End: 2024-10-25 | Stop reason: HOSPADM

## 2024-10-25 RX ORDER — NALOXONE HYDROCHLORIDE 0.4 MG/ML
0.4 INJECTION, SOLUTION INTRAMUSCULAR; INTRAVENOUS; SUBCUTANEOUS
Status: DISCONTINUED | OUTPATIENT
Start: 2024-10-25 | End: 2024-10-26 | Stop reason: HOSPADM

## 2024-10-25 RX ORDER — IBUPROFEN 800 MG/1
800 TABLET, FILM COATED ORAL
Status: COMPLETED | OUTPATIENT
Start: 2024-10-25 | End: 2024-10-25

## 2024-10-25 RX ORDER — ALBUTEROL SULFATE 90 UG/1
1-2 INHALANT RESPIRATORY (INHALATION) EVERY 4 HOURS PRN
Status: DISCONTINUED | OUTPATIENT
Start: 2024-10-25 | End: 2024-10-26 | Stop reason: HOSPADM

## 2024-10-25 RX ORDER — ACETAMINOPHEN 325 MG/1
650 TABLET ORAL EVERY 4 HOURS PRN
Status: DISCONTINUED | OUTPATIENT
Start: 2024-10-25 | End: 2024-10-25 | Stop reason: HOSPADM

## 2024-10-25 RX ORDER — OXYTOCIN/0.9 % SODIUM CHLORIDE 30/500 ML
100-340 PLASTIC BAG, INJECTION (ML) INTRAVENOUS CONTINUOUS PRN
Status: DISCONTINUED | OUTPATIENT
Start: 2024-10-25 | End: 2024-10-25

## 2024-10-25 RX ORDER — OXYTOCIN 10 [USP'U]/ML
10 INJECTION, SOLUTION INTRAMUSCULAR; INTRAVENOUS
Status: DISCONTINUED | OUTPATIENT
Start: 2024-10-25 | End: 2024-10-25

## 2024-10-25 RX ORDER — NALOXONE HYDROCHLORIDE 0.4 MG/ML
0.2 INJECTION, SOLUTION INTRAMUSCULAR; INTRAVENOUS; SUBCUTANEOUS
Status: DISCONTINUED | OUTPATIENT
Start: 2024-10-25 | End: 2024-10-25 | Stop reason: HOSPADM

## 2024-10-25 RX ORDER — BUPIVACAINE HYDROCHLORIDE 2.5 MG/ML
INJECTION, SOLUTION EPIDURAL; INFILTRATION; INTRACAUDAL
Status: COMPLETED | OUTPATIENT
Start: 2024-10-25 | End: 2024-10-25

## 2024-10-25 RX ORDER — LIDOCAINE HYDROCHLORIDE AND EPINEPHRINE 15; 5 MG/ML; UG/ML
3 INJECTION, SOLUTION EPIDURAL
Status: DISCONTINUED | OUTPATIENT
Start: 2024-10-25 | End: 2024-10-25 | Stop reason: HOSPADM

## 2024-10-25 RX ORDER — BUPIVACAINE HYDROCHLORIDE 2.5 MG/ML
10 INJECTION, SOLUTION EPIDURAL; INFILTRATION; INTRACAUDAL ONCE
Status: DISCONTINUED | OUTPATIENT
Start: 2024-10-25 | End: 2024-10-25 | Stop reason: HOSPADM

## 2024-10-25 RX ORDER — CARBOPROST TROMETHAMINE 250 UG/ML
250 INJECTION, SOLUTION INTRAMUSCULAR
Status: DISCONTINUED | OUTPATIENT
Start: 2024-10-25 | End: 2024-10-26 | Stop reason: HOSPADM

## 2024-10-25 RX ORDER — CARBOPROST TROMETHAMINE 250 UG/ML
250 INJECTION, SOLUTION INTRAMUSCULAR
Status: DISCONTINUED | OUTPATIENT
Start: 2024-10-25 | End: 2024-10-25 | Stop reason: HOSPADM

## 2024-10-25 RX ORDER — METHYLERGONOVINE MALEATE 0.2 MG/ML
200 INJECTION INTRAVENOUS
Status: DISCONTINUED | OUTPATIENT
Start: 2024-10-25 | End: 2024-10-26 | Stop reason: HOSPADM

## 2024-10-25 RX ORDER — FENTANYL CITRATE-0.9 % NACL/PF 10 MCG/ML
100 PLASTIC BAG, INJECTION (ML) INTRAVENOUS EVERY 5 MIN PRN
Status: DISCONTINUED | OUTPATIENT
Start: 2024-10-25 | End: 2024-10-25 | Stop reason: HOSPADM

## 2024-10-25 RX ORDER — KETOROLAC TROMETHAMINE 30 MG/ML
30 INJECTION, SOLUTION INTRAMUSCULAR; INTRAVENOUS
Status: COMPLETED | OUTPATIENT
Start: 2024-10-25 | End: 2024-10-25

## 2024-10-25 RX ORDER — METOCLOPRAMIDE HYDROCHLORIDE 5 MG/ML
10 INJECTION INTRAMUSCULAR; INTRAVENOUS EVERY 6 HOURS PRN
Status: DISCONTINUED | OUTPATIENT
Start: 2024-10-25 | End: 2024-10-25 | Stop reason: HOSPADM

## 2024-10-25 RX ORDER — HYDROCORTISONE 25 MG/G
CREAM TOPICAL 3 TIMES DAILY PRN
Status: DISCONTINUED | OUTPATIENT
Start: 2024-10-25 | End: 2024-10-26 | Stop reason: HOSPADM

## 2024-10-25 RX ORDER — OXYTOCIN/0.9 % SODIUM CHLORIDE 30/500 ML
340 PLASTIC BAG, INJECTION (ML) INTRAVENOUS CONTINUOUS PRN
Status: DISCONTINUED | OUTPATIENT
Start: 2024-10-25 | End: 2024-10-25 | Stop reason: HOSPADM

## 2024-10-25 RX ORDER — LOPERAMIDE HYDROCHLORIDE 2 MG/1
2 CAPSULE ORAL
Status: DISCONTINUED | OUTPATIENT
Start: 2024-10-25 | End: 2024-10-25 | Stop reason: HOSPADM

## 2024-10-25 RX ORDER — LOPERAMIDE HYDROCHLORIDE 2 MG/1
4 CAPSULE ORAL
Status: DISCONTINUED | OUTPATIENT
Start: 2024-10-25 | End: 2024-10-26 | Stop reason: HOSPADM

## 2024-10-25 RX ORDER — ONDANSETRON 4 MG/1
4 TABLET, ORALLY DISINTEGRATING ORAL EVERY 6 HOURS PRN
Status: DISCONTINUED | OUTPATIENT
Start: 2024-10-25 | End: 2024-10-25

## 2024-10-25 RX ORDER — TRANEXAMIC ACID 10 MG/ML
1 INJECTION, SOLUTION INTRAVENOUS EVERY 30 MIN PRN
Status: DISCONTINUED | OUTPATIENT
Start: 2024-10-25 | End: 2024-10-25 | Stop reason: HOSPADM

## 2024-10-25 RX ORDER — MISOPROSTOL 200 UG/1
400 TABLET ORAL
Status: DISCONTINUED | OUTPATIENT
Start: 2024-10-25 | End: 2024-10-26 | Stop reason: HOSPADM

## 2024-10-25 RX ORDER — ACETAMINOPHEN 325 MG/1
650 TABLET ORAL EVERY 4 HOURS PRN
Status: DISCONTINUED | OUTPATIENT
Start: 2024-10-25 | End: 2024-10-26 | Stop reason: HOSPADM

## 2024-10-25 RX ORDER — NALBUPHINE HYDROCHLORIDE 10 MG/ML
2.5-5 INJECTION INTRAMUSCULAR; INTRAVENOUS; SUBCUTANEOUS EVERY 6 HOURS PRN
Status: DISCONTINUED | OUTPATIENT
Start: 2024-10-25 | End: 2024-10-25

## 2024-10-25 RX ADMIN — IBUPROFEN 800 MG: 800 TABLET, FILM COATED ORAL at 20:08

## 2024-10-25 RX ADMIN — Medication 340 ML/HR: at 04:14

## 2024-10-25 RX ADMIN — BUPIVACAINE HYDROCHLORIDE 10 ML: 2.5 INJECTION, SOLUTION EPIDURAL; INFILTRATION; INTRACAUDAL at 02:38

## 2024-10-25 RX ADMIN — KETOROLAC TROMETHAMINE 30 MG: 30 INJECTION, SOLUTION INTRAMUSCULAR at 04:52

## 2024-10-25 RX ADMIN — Medication 100 MCG: at 03:42

## 2024-10-25 RX ADMIN — ACETAMINOPHEN 325MG 650 MG: 325 TABLET ORAL at 09:55

## 2024-10-25 RX ADMIN — BENZOCAINE: 11.4 AEROSOL, SPRAY TOPICAL at 14:05

## 2024-10-25 RX ADMIN — ACETAMINOPHEN 325MG 650 MG: 325 TABLET ORAL at 18:06

## 2024-10-25 RX ADMIN — ACETAMINOPHEN 325MG 650 MG: 325 TABLET ORAL at 22:28

## 2024-10-25 RX ADMIN — DOCUSATE SODIUM 100 MG: 100 CAPSULE, LIQUID FILLED ORAL at 09:55

## 2024-10-25 RX ADMIN — Medication: at 02:54

## 2024-10-25 RX ADMIN — SODIUM CHLORIDE, POTASSIUM CHLORIDE, SODIUM LACTATE AND CALCIUM CHLORIDE 500 ML: 600; 310; 30; 20 INJECTION, SOLUTION INTRAVENOUS at 02:15

## 2024-10-25 RX ADMIN — SODIUM CHLORIDE, POTASSIUM CHLORIDE, SODIUM LACTATE AND CALCIUM CHLORIDE: 600; 310; 30; 20 INJECTION, SOLUTION INTRAVENOUS at 03:25

## 2024-10-25 RX ADMIN — ACETAMINOPHEN 325MG 650 MG: 325 TABLET ORAL at 14:06

## 2024-10-25 RX ADMIN — IBUPROFEN 800 MG: 800 TABLET, FILM COATED ORAL at 14:12

## 2024-10-25 ASSESSMENT — ACTIVITIES OF DAILY LIVING (ADL)
ADLS_ACUITY_SCORE: 0

## 2024-10-25 NOTE — H&P
"[facility] Labor and Delivery History and Physical    Kaye Stanton MRN# [id number]   Age: 34 year old YOB: 1990     Date of Admission:  10/25/2024    Primary care provider: No Ref-Primary, Physician           Chief Complaint:   Kaye Stanton is a 34 year old female who is 40w5d pregnant and being admitted for SROM 11:00 pm on 10/25.  Prenatal care complicated by GCT not done but alternative of testing at home for 2 weeks.  Growth US estimate 36%  History of 4 previous , uncomplicated  GBS neg          Pregnancy history:     OBSTETRIC HISTORY:    OB History    Para Term  AB Living   5 4 4 0 0 4   SAB IAB Ectopic Multiple Live Births   0 0 0 0 4      # Outcome Date GA Lbr Abdullahi/2nd Weight Sex Type Anes PTL Lv   5 Current            4 Term 21 40w3d 13:01 / 00:01 4.13 kg (9 lb 1.7 oz) M Vag-Spont   MILENA      Name: ДМИТРИЙ STANTON KAYE      Apgar1: 8  Apgar5: 9   3 Term 20 40w1d  3.67 kg (8 lb 1.5 oz) F Vag-Spont   MILENA      Name: GIABG IVONNE KAYE      Apgar1: 8  Apgar5: 9   2 Term 10/10/18 39w3d  3.544 kg (7 lb 13 oz) F Vag-Spont  N MILENA      Name: Kathryn Villalba      Apgar1: 9  Apgar5: 9   1 Term 10/18/16 41w0d  3.884 kg (8 lb 9 oz) M Vag-Spont  N MILENA      Name: Kalin      Apgar1: 9  Apgar5: 9       EDC: Estimated Date of Delivery: 10/20/24    Prenatal Labs:   Lab Results   Component Value Date    AS Negative 10/25/2024    HEPBANG Nonreactive 2024    CHPCRT Negative 2024    GCPCRT Negative 2024    HGB 10.8 (L) 10/25/2024       GBS Status:   No results found for: \"GBS\"    Active Problem List  Patient Active Problem List   Diagnosis    Acute appendicitis with localized peritonitis, without perforation, abscess, or gangrene    Acute appendicitis with localized peritonitis, unspecified whether abscess present, unspecified whether gangrene present, unspecified whether perforation present    History of gestational diabetes mellitus    Indication for care " in labor or delivery       Medication Prior to Admission  Medications Prior to Admission   Medication Sig Dispense Refill Last Dose/Taking    albuterol (PROAIR HFA/PROVENTIL HFA/VENTOLIN HFA) 108 (90 Base) MCG/ACT inhaler Inhale 1-2 puffs into the lungs (Patient not taking: Reported on 10/24/2024)       docusate sodium (COLACE) 100 MG capsule Take 100 mg by mouth 2 times daily       folic acid (FOLVITE) 400 MCG tablet Take 400 mcg by mouth daily       Nutritional Supplements (NUTRITIONAL SUPPLEMENT PO) Magnesium Glycinate      .        Maternal Past Medical History:     Past Medical History:   Diagnosis Date    Childhood asthma     still uses inhaler if sick    History of gestational diabetes     x2                       Family History:   This patient has no significant family history            Social History:   This patient has no significant social history         Review of Systems:   CONSTITUTIONAL: NEGATIVE for fever, chills, change in weight  ENT/MOUTH: NEGATIVE for ear, mouth and throat problems  RESP: NEGATIVE for significant cough or SOB  CV: NEGATIVE for chest pain, palpitations or peripheral edema          Physical Exam:   Vitals were reviewed  Patient Vitals for the past 8 hrs:   BP Temp Temp src Resp SpO2   10/25/24 0328 -- -- -- -- 93 %   10/25/24 0326 97/71 -- -- -- 95 %   10/25/24 0322 105/70 -- -- -- --   10/25/24 0321 -- -- -- -- 92 %   10/25/24 0306 107/59 -- -- -- --   10/25/24 0300 106/60 -- -- -- --   10/25/24 0254 113/66 -- -- -- --   10/25/24 0252 112/59 -- -- -- --   10/25/24 0250 108/58 -- -- -- --   10/25/24 0244 118/55 -- -- -- --   10/25/24 0228 127/53 98.4  F (36.9  C) Oral 18 --     Neck:   Supple, symmetrical, trachea midline, no adenopathy, thyroid symmetric, not enlarged and no tenderness, skin normal     Lungs:   No increased work of breathing, good air exchange, clear to auscultation bilaterally, no crackles or wheezing     Cardiovascular:   Normal apical impulse, regular rate and  rhythm, normal S1 and S2, no S3 or S4, and no murmur noted     Abdomen:   No scars, normal bowel sounds, soft, non-distended, non-tender, no masses palpated, no hepatosplenomegally      Cervix:   Membranes: SROM   Dilation: 6     Presentation:Vertex   US confirms vertex    Fetal Heart Rate Tracing: reactive and reassuring  Tocometer: external monitor and frequency q 4 minutes                       Assessment:   Heather Ford is a 40w5d pregnant female admitted with active labor management and SROM.          Plan:   Admit - see IP orders  Pain medication epidural  Anticipate     Prasanth Eli MD

## 2024-10-25 NOTE — PLAN OF CARE
Data: Heather Ford transferred to Room 431 via wheelchair at 0705. Baby transferred via parent's arms.  Action: Receiving unit notified of transfer: Yes. Patient and family notified of room change. Report given to EVERARDO Fowler at 0740. Belongings sent to receiving unit. Accompanied by Registered Nurse. Oriented patient to surroundings. Call light within reach. ID bands double-checked with receiving RN.  Response: Patient tolerated transfer and is stable.    .Patients mobililty level scored using the bedside mobility assistance tool (BMAT). Patient is at a mobility level test number: 3. Mobility equipment used: wheelchair. Required assist of 1 staff members. Further use of BMAT scoring required.     Pt epidural removed, QBL= 150, no tear, pain under control, bleeding minimal. Fundus @U. Pt ambulated to bathroom voided 400 mL a little light headed when ambulated with 1 assist.

## 2024-10-25 NOTE — PLAN OF CARE
.Data: Patient presented to [facility]: 10/25/2024  1:49 AM.  Reason for maternal/fetal assessment is uterine contractions. Patient reports her water broke at 11pm and has been monica 4-5 mins for the last 2 hours .  Patient is a .  Prenatal record reviewed. Pregnancy has been uncomplicated..  Gestational Age 40w5d. VSS. Fetal movement active. Patient denies nausea, vomiting, headache, visual disturbances, epigastric or URQ pain, significant edema. Support person is present.   Action: Verbal consent for EFM. Triage assessment completed. Bill of rights reviewed.  Response: Patient verbalized agreement with plan. Will contact Dr Prasanth Eli with update and for further orders.

## 2024-10-25 NOTE — L&D DELIVERY NOTE
OB Vaginal Delivery Note    Heather Ford MRN# [id number]   Age: 34 year old YOB: 1990     Pt is a 34 year old  presenting with labor and SROM 11PM on 10/24      GA: 40w5d  GP:   Labor Complications:    EBL:   mL  Delivery QBL: 150 mL  Delivery Type: Vaginal, Spontaneous  ROM to Delivery Time: rupture date or rupture time have not been documented  [place] Weight:     1 Minute 5 Minute 10 Minute   Apgar Totals: 8   9        VIKTOR HURST;SANDHYA RESTREPO    Delivery Details:  Heather Ford, a 34 year old  female delivered a viable infant with apgars of 8  and 9 . Patient was fully dilated and pushing after   hours   minutes in active labor. Delivery was via vaginal, spontaneous to a sterile field under epidural anesthesia. Infant delivered in vertex left occiput anterior position. Anterior and posterior shoulders delivered without difficulty. The cord was clamped, cut twice and 3 vessels were noted. Cord blood was obtained in routine fashion with the following disposition: discard.      Cord complications: none  Placenta delivered at 10/25/2024  4:20 AM. Placental disposition was Hospital disposal. Fundal massage performed and fundus found to be firm.     Episiotomy: none   Perineum, vagina, cervix were inspected, and the following lacerations were noted:   Perineal lacerations: none                 Excellent hemostasis was noted. Needle count correct. Infant and patient in delivery room in good and stable condition.        Veronique Ford-Heather [[id number]]      Labor Event Times      Dilation complete date: 10/25/24 Complete time:  4:04 AM   Start pushing date/time: 10/25/2024 0409          Labor Length      2nd Stage (hrs): 0 (min): 10   3rd Stage (hrs): 0 (min): 6          Labor Events     labor?: No   steroids: None  Labor Type: Spontaneous  Predominate monitoring during 1st stage: continuous electronic fetal monitoring     Antibiotics received during  labor?: No     Augmentation: None       Delivery/Placenta Date and Time      Delivery Date: 10/25/24 Delivery Time:  4:14 AM   Placenta Date/Time: 10/25/2024  4:20 AM  Oxytocin given at the time of delivery: after delivery of baby  Delivering clinician: Prasanth Eli MD   Other personnel present at delivery:  Provider Role   Sid Bowles RN Delivery Nurse   Sandy Harper, RN Delivery Assist             Vaginal Counts       Initial count performed by 2 team members:  Two Team Members   Dr. CARLOS Hoang RN         Needles Suture Needles Sponges (RETIRED) Instruments   Initial counts 2  5    Added to count       Relief counts       Final counts 2  5            Placed during labor Accounted for at the end of labor   FSE No NA   IUPC No NA   Cervidil No NA                  Final count performed by 2 team members:  Two Team Members   EVERARDO Hoang Dr.      Final count correct?: Yes  Pre-Birth Team Brief: Complete  Post-Birth Team Debrief: Complete       Apgars    Living status: Living   1 Minute 5 Minute 10 Minute 15 Minute 20 Minute   Skin color: 0  1       Heart rate: 2  2       Reflex irritability: 2  2       Muscle tone: 2  2       Respiratory effort: 2  2       Total: 8  9       Apgars assigned by: EVERARDO HOANG       Cord      Vessels: 3 Vessels    Cord Complications: None               Cord Blood Disposition: Discard    Gases Sent?: No    Delayed cord clamping?: Yes    Cord Clamping Delay (seconds):  seconds    Stem cell collection?: No            Resuscitation    Methods: None       Skin to Skin and Feeding Plan      Skin to skin initiation date/time: 1/10/1841    Skin to skin with: Mother  Skin to skin end date/time:            Labor Events and Shoulder Dystocia    Fetal Tracing Prior to Delivery: Category 1  Shoulder dystocia present?: Neg       Delivery (Maternal) (Provider to Complete) ([id number])    Episiotomy: None  Perineal lacerations: None    Repair  suture: None  Genital tract inspection done: Pos       Blood Loss  Mother: Heather Ford #[id number]     Start of Mother's Information      Delivery Blood Loss   Intrapartum & Postpartum: 10/24/24 1614 - 10/25/24 0456    Delivery Admission: 10/24/24 1614 - 10/25/24 0456         Intrapartum & Postpartum Delivery Admission    Delivery QBL (mL) Hospital Encounter 150 mL 150 mL    Total  150 mL 150 mL               End of Mother's Information  Mother: Heather Ford #[id number]                Delivery - Provider to Complete ([id number])    Delivering clinician: Prasanth Eli MD  Delivery Type (Choose the 1 that will go to the Birth History): Vaginal, Spontaneous                         Other personnel:  Provider Role   Soco-Sid Scott, RN Delivery Nurse   Sandy Harper, RN Delivery Assist                    Placenta    Date/Time: 10/25/2024  4:20 AM  Removal: Spontaneous  Disposition: Hospital disposal             Anesthesia    Method: Epidural                    Presentation and Position    Presentation: Vertex    Position: Left Occiput Anterior                     Prasanth Eli MD

## 2024-10-25 NOTE — PROVIDER NOTIFICATION
10/25/24 0216   Provider Notification   Provider Name/Title    Method of Notification Phone   Request Evaluate - Remote   Notification Reason Patient Arrived     Paged MD to inform of pt arrival, pt is a  40.5w GBS- A+, came in for rule out rupture/ labor. Pt is grossly ruptured, clear fluid. SVE: /-1. FHR tracing minimal, early decelerations, no accels, pt is monica 2-3 mins apart palpating moderate to strong. Pt is rating pain 6/10 and seeking epidural. Pt has a hx of GDM diet control in previous pregnancy. MD gave admission orders, and will come to see patient bedside once comfortable with epidural. Will keep updated.

## 2024-10-25 NOTE — ANESTHESIA PREPROCEDURE EVALUATION
Anesthesia Pre-Procedure Evaluation    Patient: Heather Ford   MRN: [id number] : 1990        Procedure :           Past Medical History:   Diagnosis Date    Childhood asthma     still uses inhaler if sick    History of gestational diabetes     x2      Past Surgical History:   Procedure Laterality Date    LAPAROSCOPIC APPENDECTOMY N/A 2024    Procedure: APPENDECTOMY, LAPAROSCOPIC;  Surgeon: Eliseo Sinha MD;  Location: [facility] OR      No Known Allergies   Social History     Tobacco Use    Smoking status: Never    Smokeless tobacco: Never   Substance Use Topics    Alcohol use: Not Currently      Wt Readings from Last 1 Encounters:   10/24/24 68.5 kg (151 lb)        Anesthesia Evaluation        No history of anesthetic complications       ROS/MED HX  ENT/Pulmonary:  - neg pulmonary ROS     Neurologic:  - neg neurologic ROS     Cardiovascular:  - neg cardiovascular ROS     METS/Exercise Tolerance:     Hematologic:  - neg hematologic  ROS     Musculoskeletal:       GI/Hepatic:  - neg GI/hepatic ROS     Renal/Genitourinary:       Endo:  - neg endo ROS     Psychiatric/Substance Use:  - neg psychiatric ROS     Infectious Disease:       Malignancy:       Other:     (-) previous  and TOLAC candidate       Physical Exam    Airway        Mallampati: II   TM distance: > 3 FB   Neck ROM: full   Mouth opening: > 3 cm    Respiratory Devices and Support         Dental  no notable dental history         Cardiovascular   cardiovascular exam normal          Pulmonary   pulmonary exam normal                OUTSIDE LABS:  CBC:   Lab Results   Component Value Date    WBC 7.5 2024    WBC 13.8 (H) 2024    HGB 11.7 2024    HGB 12.2 2024    HCT 34.7 (L) 2024    HCT 35.8 2024     2024     2024     BMP:   Lab Results   Component Value Date     (L) 2024    POTASSIUM 3.9 2024    CHLORIDE 98 2024    CO2 24 2024    BUN 8.1  "02/20/2024    CR 0.65 02/20/2024    GLC 88 02/20/2024     COAGS: No results found for: \"PTT\", \"INR\", \"FIBR\"  POC: No results found for: \"BGM\", \"HCG\", \"HCGS\"  HEPATIC: No results found for: \"ALBUMIN\", \"PROTTOTAL\", \"ALT\", \"AST\", \"GGT\", \"ALKPHOS\", \"BILITOTAL\", \"BILIDIRECT\", \"JIMBO\"  OTHER:   Lab Results   Component Value Date    JOHAN 9.4 02/20/2024       Anesthesia Plan    ASA Status:  2       Anesthesia Type: Epidural.              Consents    Anesthesia Plan(s) and associated risks, benefits, and realistic alternatives discussed. Questions answered and patient/representative(s) expressed understanding.     - Discussed:     - Discussed with:  Patient            Postoperative Care            Comments:           neg OB ROS.      Miguel Patrick MD    I have reviewed the pertinent notes and labs in the chart from the past 30 days and (re)examined the patient.  Any updates or changes from those notes are reflected in this note.                                 "

## 2024-10-25 NOTE — ANESTHESIA PROCEDURE NOTES
"Epidural catheter Procedure Note    Pre-Procedure   Staff -        Anesthesiologist:  Miguel Patrick MD       Performed By: anesthesiologist       Referred By: Sreedhar       Location: OB       Pre-Anesthestic Checklist: patient identified, IV checked, risks and benefits discussed, informed consent, monitors and equipment checked, pre-op evaluation, at physician/surgeon's request and post-op pain management  Timeout:       Correct Patient: Yes        Correct Procedure: Yes        Correct Site: Yes        Correct Position: Yes   Procedure Documentation  Procedure: epidural catheter       Patient Position: sitting       Patient Prep/Sterile Barriers: sterile gloves, mask, patient draped       Skin prep: Betadine       Local skin infiltrated with mL of 1% lidocaine.        Insertion Site: L3-4. (midline approach).       Technique: LORT saline        TOMA at 3 cm.       Needle Type: NetzVacationy needle       Needle Gauge: 17.        Needle Length (Inches): 3.5        Catheter: 19 G.          Catheter threaded easily.         6 cm epidural space.         Threaded 9 cm at skin.         # of attempts: 1 and  # of redirects:     Assessment/Narrative         Paresthesias: No.       Test dose of 3 mL lidocaine 1.5% w/ 1:200,000 epinephrine at 02:35 CDT.         Test dose negative, 3 minutes after injection, for signs of intravascular, subdural, or intrathecal injection.       Insertion/Infusion Method: LORT saline       Aspiration negative for Heme or CSF via Epidural Catheter.    Medication(s) Administered   0.25% Bupivacaine PF (Epidural) - EPIDURAL   10 mL - 10/25/2024 2:38:00 AM    FOR [facility] ([facility]) ONLY:   Pain Team Contact information: please page the Pain Team Via [employer]. Search \"Pain\". During daytime hours, please page the attending first. At night please page the resident first.      "

## 2024-10-26 VITALS
BODY MASS INDEX: 23.7 KG/M2 | RESPIRATION RATE: 16 BRPM | WEIGHT: 142.4 LBS | OXYGEN SATURATION: 95 % | HEART RATE: 76 BPM | SYSTOLIC BLOOD PRESSURE: 117 MMHG | DIASTOLIC BLOOD PRESSURE: 68 MMHG | TEMPERATURE: 97.8 F

## 2024-10-26 PROCEDURE — 250N000013 HC RX MED GY IP 250 OP 250 PS 637: Performed by: OBSTETRICS & GYNECOLOGY

## 2024-10-26 RX ADMIN — ACETAMINOPHEN 325MG 650 MG: 325 TABLET ORAL at 10:12

## 2024-10-26 RX ADMIN — IBUPROFEN 800 MG: 800 TABLET, FILM COATED ORAL at 02:19

## 2024-10-26 RX ADMIN — IBUPROFEN 800 MG: 800 TABLET, FILM COATED ORAL at 08:32

## 2024-10-26 RX ADMIN — ACETAMINOPHEN 325MG 650 MG: 325 TABLET ORAL at 04:54

## 2024-10-26 RX ADMIN — DOCUSATE SODIUM 100 MG: 100 CAPSULE, LIQUID FILLED ORAL at 08:32

## 2024-10-26 ASSESSMENT — ACTIVITIES OF DAILY LIVING (ADL)
ADLS_ACUITY_SCORE: 0

## 2024-10-26 NOTE — PLAN OF CARE
Goal Outcome Evaluation:      Plan of Care Reviewed With: patient, spouse    Overall Patient Progress: improvingOverall Patient Progress: improving         VSS, Bonding well with baby. Pain is well controlled with tylenol, ibuprofen and T-pump. Patient is voiding without difficulty and ambulating independently. Tolerating regular diet well. Independent in self and baby cares. Breast feeding is going well.

## 2024-10-26 NOTE — PLAN OF CARE
"Pt VSS. Postpartum checks WDL. Is bonding well with infant. Tolerating regular diet and able to ambulate independently. Urine output adequate, voids without difficulty. Pt utilizing tylenol and ibuprofen for pain management. Breastfeeding infant every 2-3 hours.    Problem: Adult Inpatient Plan of Care  Goal: Plan of Care Review  Description: The Plan of Care Review/Shift note should be completed every shift.  The Outcome Evaluation is a brief statement about your assessment that the patient is improving, declining, or no change.  This information will be displayed automatically on your shift  note.  Outcome: Progressing  Flowsheets (Taken 10/26/2024 0514)  Plan of Care Reviewed With: patient  Overall Patient Progress: improving  Goal: Patient-Specific Goal (Individualized)  Description: You can add care plan individualizations to a care plan. Examples of Individualization might be:  \"Parent requests to be called daily at 9am for status\", \"I have a hard time hearing out of my right ear\", or \"Do not touch me to wake me up as it startles  me\".  Outcome: Progressing  Goal: Absence of Hospital-Acquired Illness or Injury  Outcome: Progressing  Intervention: Prevent Skin Injury  Recent Flowsheet Documentation  Taken 10/26/2024 0230 by Christine Mojica RN  Body Position: position changed independently  Intervention: Prevent Infection  Recent Flowsheet Documentation  Taken 10/26/2024 0230 by Christine Mojica RN  Infection Prevention:   hand hygiene promoted   rest/sleep promoted  Goal: Optimal Comfort and Wellbeing  Outcome: Progressing  Intervention: Monitor Pain and Promote Comfort  Recent Flowsheet Documentation  Taken 10/25/2024 2228 by Christine Mojica RN  Pain Management Interventions: medication (see MAR)  Taken 10/25/2024 2008 by Christine Mojica RN  Pain Management Interventions: medication (see MAR)  Intervention: Provide Person-Centered Care  Recent Flowsheet Documentation  Taken 10/26/2024 0230 by Christine Mojica" S, RN  Trust Relationship/Rapport:   care explained   choices provided   questions answered   questions encouraged   thoughts/feelings acknowledged  Goal: Readiness for Transition of Care  Outcome: Progressing     Problem: Postpartum (Vaginal Delivery)  Goal: Successful Parent Role Transition  Outcome: Progressing  Goal: Hemostasis  Outcome: Progressing  Goal: Absence of Infection Signs and Symptoms  Outcome: Progressing  Goal: Anesthesia/Sedation Recovery  Outcome: Progressing  Goal: Optimal Pain Control and Function  Outcome: Progressing  Intervention: Prevent or Manage Pain  Recent Flowsheet Documentation  Taken 10/25/2024 2228 by Christine Mojica, RN  Pain Management Interventions: medication (see MAR)  Taken 10/25/2024 2008 by Christine Mojica, RN  Pain Management Interventions: medication (see MAR)  Goal: Effective Urinary Elimination  Outcome: Progressing

## 2024-10-26 NOTE — PLAN OF CARE
Goal Outcome Evaluation:    Plan of Care Reviewed With: pt     Overall Patient Progress: improving    Outcome Evaluation: Pt has met all care plan goals    Vital signs stable. Pain managed with Tylenol and Ibuprofen.  Pt is ambulating on her own, voiding without difficulty and independent in cares for self and baby.  Pt is interacting positively with baby.   at bedside and supportive.  Pt desires an early discharge.    Discharge instructions reviewed with pt.  All questions and concerns addressed. Pt verbalized understanding.  Pt has a breast pump at home. No discharge medications ordered. Pt discharged home via wheelchair with all of her belongings in stable condition accompanied by her  at 1145.            Problem: Adult Inpatient Plan of Care  Goal: Absence of Hospital-Acquired Illness or Injury  Intervention: Prevent Skin Injury  Recent Flowsheet Documentation  Taken 10/26/2024 0837 by Sherrill Andres RN  Body Position: position changed independently  Intervention: Prevent Infection  Recent Flowsheet Documentation  Taken 10/26/2024 0837 by Sherrill Andres RN  Infection Prevention: hand hygiene promoted  Goal: Optimal Comfort and Wellbeing  Intervention: Provide Person-Centered Care  Recent Flowsheet Documentation  Taken 10/26/2024 0837 by Sherrill Andres RN  Trust Relationship/Rapport:   care explained   choices provided   questions answered   questions encouraged

## 2024-10-26 NOTE — ANESTHESIA POSTPROCEDURE EVALUATION
Patient: Heather Ford    Procedure: * No procedures listed *       Anesthesia Type:  Epidural    Note:  Disposition: Inpatient   Postop Pain Control: Uneventful            Sign Out: Well controlled pain   PONV: No   Neuro/Psych: Uneventful            Sign Out: Acceptable/Baseline neuro status   Airway/Respiratory: Uneventful            Sign Out: Acceptable/Baseline resp. status   CV/Hemodynamics: Uneventful            Sign Out: Acceptable CV status; No obvious hypovolemia; No obvious fluid overload   Other NRE:    DID A NON-ROUTINE EVENT OCCUR? No    Event details/Postop Comments:  Patient doing well.  Residual neuraxial block resolved with no reported numbness or paresthesias.  Ambulating, voiding, and eating without difficulty. Denies positional headache.  Pain well controlled. No bowel or bladder changes. Minimal back discomfort at epidural site. No apparent epidural complications.  Questions encouraged and answered.                Last vitals:  Vitals:    10/25/24 1806 10/26/24 0219 10/26/24 0837   BP: 115/66 106/57 117/68   Pulse:  82 76   Resp: 16 16 16   Temp: 97.5  F (36.4  C) 97.9  F (36.6  C) 97.8  F (36.6  C)   SpO2:          Electronically Signed By: Kamla Feliciano MD  October 26, 2024  11:00 AM

## 2024-10-26 NOTE — DISCHARGE INSTRUCTIONS
Warning Signs after Having a Baby    Keep this paper on your fridge or somewhere else where you can see it.    Call your provider if you have any of these symptoms up to 12 weeks after having your baby.    Thoughts of hurting yourself or your baby  Pain in your chest or trouble breathing  Severe headache not helped by pain medicine  Eyesight concerns (blurry vision, seeing spots or flashes of light, other changes to eyesight)  Fainting, shaking or other signs of a seizure    Call 9-1-1 if you feel that it is an emergency.     The symptoms below can happen to anyone after giving birth. They can be very serious. Call your provider if you have any of these warning signs.    My provider s phone number: _______________________    Losing too much blood (hemorrhage)    Call your provider if you soak through a pad in less than an hour or pass blood clots bigger than a golf ball. These may be signs that you are bleeding too much.    Blood clots in the legs or lungs    After you give birth, your body naturally clots its blood to help prevent blood loss. Sometimes this increased clotting can happen in other areas of the body, like the legs or lungs. This can block your blood flow and be very dangerous.     Call your provider if you:  Have a red, swollen spot on the back of your leg that is warm or painful when you touch it.   Are coughing up blood.     Infection    Call your provider if you have any of these symptoms:  Fever of 100.4 F (38 C) or higher.  Pain or redness around your stitches if you had an incision.   Any yellow, white, or green fluid coming from places where you had stitches or surgery.    Mood Problems (postpartum depression)    Many people feel sad or have mood changes after having a baby. But for some people, these mood swings are worse.     Call your provider right away if you feel so anxious or nervous that you can't care for yourself or your baby.    Preeclampsia (high blood pressure)    Even if you  "didn't have high blood pressure when you were pregnant, you are at risk for the high blood pressure disease called preeclampsia. This risk can last up to 12 weeks after giving birth.     Call your provider if you have:   Pain on your right side under your rib cage  Sudden swelling in the hands and face    Remember: You know your body. If something doesn't feel right, get medical help.     For informational purposes only. Not to replace the advice of your health care provider. Copyright 2020 [place] [employer] [facility]. All rights reserved. Clinically reviewed by Dominique Marin, RNC-OB, MSN. [employer] [id number] - Rev .    Postpartum Care at Home With Your Baby: Care Instructions  Overview     After childbirth (postpartum period), your body goes through many changes as you recover. In these weeks after delivery, try to take good care of yourself. Get rest whenever you can and accept help from others.  It may take 4 to 6 weeks to feel like yourself again, and possibly longer if you had a  birth. You may feel sore or very tired as you recover. After delivery, you may continue to have contractions as the uterus returns to the size it was before your pregnancy. You will also have some vaginal bleeding. And you may have pain around the vagina as you heal. Several days after delivery you may also have pain and swelling in your breasts as they fill with milk. There are things you can do at home to help ease these discomforts.  After childbirth, it's common to feel emotional. You may feel irritable, cry easily, and feel happy one minute and sad the next. This is called the \"baby blues.\" Hormone changes are one cause of these emotional changes. These feelings usually get better within a couple of weeks. If they don't, talk to your doctor or midwife.  In the first couple of weeks after you give birth, your doctor or midwife may want to check in with you and make a plan for follow-up care. You will likely have a " complete postpartum visit in the first 3 months after delivery. At that time, your doctor or midwife will check on your recovery and see how you're doing. But if you have questions or concerns before then, you can always call your doctor or midwife.  Follow-up care is a key part of your treatment and safety. Be sure to make and go to all appointments, and call your doctor if you are having problems. It's also a good idea to know your test results and keep a list of the medicines you take.  How can you care for yourself at home?  Taking care of your body  Use pads instead of tampons for bleeding. After birth, you will have bloody vaginal discharge. You may also pass some blood clots that shouldn't be bigger than an egg. Over the next 6 weeks or so, your bleeding should decrease a little every day and slowly change to a pinkish and then whitish discharge.  For cramps or mild pain, try an over-the-counter pain medicine, such as acetaminophen (Tylenol) or ibuprofen (Advil, Motrin). Read and follow all instructions on the label.  To ease pain around the vagina or from hemorrhoids:  Put ice or a cold pack on the area for 10 to 20 minutes at a time. Put a thin cloth between the ice and your skin.  Try sitting in a few inches of warm water (sitz bath) when you can or after bowel movements.  Clean yourself with a gentle squeeze of warm water from a bottle instead of wiping with toilet paper.  Use witch hazel or hemorrhoid pads (such as Tucks).  Try using a cold compress for sore and swollen breasts. And wear a supportive bra that fits.  Ease constipation by drinking plenty of fluids and eating high-fiber foods. Ask your doctor or midwife about over-the-counter stool softeners.  Activity  Rest when you can.  Ask for help from family or friends when you need it.  If you can, have another adult in your home for at least 2 or 3 days after birth.  When you feel ready, try to get some exercise every day. For many people, walking  is a good choice. Don't do any heavy exercise until your doctor or midwife says it's okay.  Ask your doctor or midwife when it is okay to have vaginal sex.  If you don't want to get pregnant, talk to your doctor or midwife about birth control options. You can get pregnant even before your period returns. Also, you can get pregnant while you are breastfeeding.  Talk to your doctor or midwife if you want to get pregnant again. They can talk to you about when it is safe.  Emotional health  It's normal to have some sadness, anxiety, and mood swings after delivery. It may help to talk with a trusted friend or family member. You can also call the Maternal Mental Health Hotline at 8-942-[facility] (1-133.125.3586) for support. If these mood changes last more than a couple of weeks, talk to your doctor or midwife.  When should you call for help?  Share this information with your partner, family, or a friend. They can help you watch for warning signs.  Call 911  anytime you think you may need emergency care. For example, call if:    You feel you cannot stop from hurting yourself, your baby, or someone else.     You passed out (lost consciousness).     You have chest pain, are short of breath, or cough up blood.     You have a seizure.   Where to get help 24 hours a day, 7 days a week   If you or someone you know talks about suicide, self-harm, a mental health crisis, a substance use crisis, or any other kind of emotional distress, get help right away. You can:    Call the Suicide and Crisis Lifeline at 278.     Call 8-850-125-TALK (1-251.440.5880).     Text HOME to 360362 to access the Crisis Text Line.   Consider saving these numbers in your phone.  Go to [employer] for more information or to chat online.  Call your doctor or midwife now or seek immediate medical care if:    You have signs of hemorrhage (too much bleeding), such as:  Heavy vaginal bleeding. This means that you are soaking through one or more pads in an  "hour. Or you pass blood clots bigger than an egg.  Feeling dizzy or lightheaded, or you feel like you may faint.  Feeling so tired or weak that you cannot do your usual activities.  A fast or irregular heartbeat.  New or worse belly pain.     You have signs of infection, such as:  A fever.  Increased pain, swelling, warmth, or redness from an incision or wound.  Frequent or painful urination or blood in your urine.  Vaginal discharge that smells bad.  New or worse belly pain.     You have symptoms of a blood clot in your leg (called a deep vein thrombosis), such as:  Pain in the calf, back of the knee, thigh, or groin.  Swelling in the leg or groin.  A color change on the leg or groin. The skin may be reddish or purplish, depending on your usual skin color.     You have signs of preeclampsia, such as:  Sudden swelling of your face, hands, or feet.  New vision problems (such as dimness, blurring, or seeing spots).  A severe headache.     You have signs of heart failure, such as:  New or increased shortness of breath.  New or worse swelling in your legs, ankles, or feet.  Sudden weight gain, such as more than 2 to 3 pounds in a day or 5 pounds in a week.  Feeling so tired or weak that you cannot do your usual activities.     You had spinal or epidural pain relief and have:  New or worse back pain.  Increased pain, swelling, warmth, or redness at the injection site.  Tingling, weakness, or numbness in your legs or groin.   Watch closely for changes in your health, and be sure to contact your doctor or midwife if:    Your vaginal bleeding isn't decreasing.     You feel sad, anxious, or hopeless for more than a few days.     You are having problems with your breasts or breastfeeding.   Where can you learn more?  Go to https://www.healthwise.net/patiented  Enter Z768 in the search box to learn more about \"Postpartum Care at Home With Your Baby: Care Instructions.\"  Current as of: July 10, 2023  Content Version: 14.2    " 2024 [facility] [employer]   Care instructions adapted under license by your healthcare professional. If you have questions about a medical condition or this instruction, always ask your healthcare professional. [employer]wise[employer] Incorporated disclaims any warranty or liability for your use of this information.

## 2024-10-26 NOTE — DISCHARGE SUMMARY
VAGINAL DELIVERY DISCHARGE SUMMARY    Admit date: 10/25/2024  Discharge date: 10/26/2024     Admit Dx:   34 year old  at 40w5d    Indication for care in labor or delivery  Normal labor and delivery     Discharge Dx:  - Same as above, s/p   Acute blood loss anemia    Procedures:  - Spontaneous vaginal delivery    Admit HPI:  Heather Ford is a 34 year old female who is 40w5d pregnant and being admitted for SROM 11:00 pm on 10/25.  Prenatal care complicated by GCT not done but alternative of testing at home for 2 weeks.  Growth US estimate 36%  History of 4 previous , uncomplicated  GBS neg     Please see her admit H&P for full details of her PMH, PSH, Meds, Allergies and exam on admit.    Hospital course:  Heather Ford, a 34 year old  female delivered a viable infant with apgars of 8  and 9 . Patient was fully dilated and pushing after   hours   minutes in active labor. Delivery was via vaginal, spontaneous to a sterile field under epidural anesthesia. Infant delivered in vertex left occiput anterior position. Anterior and posterior shoulders delivered without difficulty. The cord was clamped, cut twice and 3 vessels were noted. Cord blood was obtained in routine fashion with the following disposition: discard.       Cord complications: none  Placenta delivered at 10/25/2024  4:20 AM. Placental disposition was Hospital disposal. Fundal massage performed and fundus found to be firm.      Episiotomy: none   Perineum, vagina, cervix were inspected, and the following lacerations were noted:   Perineal lacerations: none                   Excellent hemostasis was noted. Needle count correct. Infant and patient in delivery room in good and stable condition.      Please see her Delivery Summary for full details regarding her delivery.    Her postpartum course was complicated by nothing. By the time of discharge, she was meeting all of her postpartum goals and deemed stable for discharge. She was voiding  "without difficulty, tolerating a regular diet without nausea and vomiting, her pain was well controlled on oral pain medicines and her lochia was appropriate. She was hemodynamically stable.     Physical exam on the day of discharge:  Vitals:    10/25/24 1415 10/25/24 1806 10/26/24 0219 10/26/24 0837   BP: 106/66 115/66 106/57 117/68   BP Location: Right arm Right arm Right arm Right arm   Patient Position: Semi-Xiong's Semi-Xiong's Semi-Xiong's Semi-Xiong's   Cuff Size: Adult Regular  Adult Regular Adult Regular   Pulse: 78  82 76   Resp: 16 16 16 16   Temp: 98.4  F (36.9  C) 97.5  F (36.4  C) 97.9  F (36.6  C) 97.8  F (36.6  C)   TempSrc: Oral Oral Oral Oral   SpO2:       Weight:    64.6 kg (142 lb 6.4 oz)       General: sitting up, alert and cooperative  Abd: soft, non-distended, non-tender. Pt nursing baby, fundal check deferred  Extremities: calves nontender, tyrace edema of lower extremities bilaterally    Lab Results   Component Value Date    HGB 9.8 10/25/2024    HGB 10.8 10/25/2024     Blood type: No results found for: \"RH\"    Discharge/Disposition:  Ms. Heather Ford was discharged to home in stable condition with the following instructions/medications:  1) Call for temperature > 100.4, foul smelling vaginal discharge, bleeding > 1 pad per hour x 2 hrs, pain not controlled by oral pain meds, thoughts of self-harm or harming the infant.  2) Contraception counseling was provided.  3) She was instructed to follow-up with her primary OB in 6 weeks for a routine postpartum visit, and in 1 week for a blood pressure check if having any blood pressure issues while hospitalized.  4) She was instructed to continue her PNV on discharge if she wished to breast feed her infant.  5) She was discharged home with the following medications:    Current Discharge Medication List        CONTINUE these medications which have NOT CHANGED    Details   albuterol (PROAIR HFA/PROVENTIL HFA/VENTOLIN HFA) 108 (90 Base) MCG/ACT " inhaler Inhale 1-2 puffs into the lungs      docusate sodium (COLACE) 100 MG capsule Take 100 mg by mouth 2 times daily      folic acid (FOLVITE) 400 MCG tablet Take 400 mcg by mouth daily      Nutritional Supplements (NUTRITIONAL SUPPLEMENT PO) Magnesium Glycinate              Destiny Alex MD, MPH  [facility]

## 2025-02-17 ENCOUNTER — OFFICE VISIT (OUTPATIENT)
Dept: FAMILY MEDICINE | Facility: CLINIC | Age: 35
End: 2025-02-17
Payer: COMMERCIAL

## 2025-02-17 ENCOUNTER — HOSPITAL ENCOUNTER (OUTPATIENT)
Dept: ULTRASOUND IMAGING | Facility: CLINIC | Age: 35
Discharge: HOME | End: 2025-02-17
Attending: STUDENT IN AN ORGANIZED HEALTH CARE EDUCATION/TRAINING PROGRAM | Admitting: STUDENT IN AN ORGANIZED HEALTH CARE EDUCATION/TRAINING PROGRAM
Payer: COMMERCIAL

## 2025-02-17 VITALS
DIASTOLIC BLOOD PRESSURE: 71 MMHG | BODY MASS INDEX: 21.66 KG/M2 | TEMPERATURE: 98.4 F | HEIGHT: 65 IN | HEART RATE: 86 BPM | OXYGEN SATURATION: 98 % | WEIGHT: 130 LBS | RESPIRATION RATE: 16 BRPM | SYSTOLIC BLOOD PRESSURE: 107 MMHG

## 2025-02-17 DIAGNOSIS — Z90.49 HX OF APPENDECTOMY: ICD-10-CM

## 2025-02-17 DIAGNOSIS — R10.31 RIGHT LOWER QUADRANT PAIN: Primary | ICD-10-CM

## 2025-02-17 DIAGNOSIS — R10.31 RIGHT LOWER QUADRANT PAIN: ICD-10-CM

## 2025-02-17 LAB
BASOPHILS # BLD AUTO: 0 10E3/UL (ref 0–0.2)
BASOPHILS NFR BLD AUTO: 1 %
EOSINOPHIL # BLD AUTO: 0.2 10E3/UL (ref 0–0.7)
EOSINOPHIL NFR BLD AUTO: 4 %
ERYTHROCYTE [DISTWIDTH] IN BLOOD BY AUTOMATED COUNT: 14.2 % (ref 10–15)
HCT VFR BLD AUTO: 41.1 % (ref 35–47)
HGB BLD-MCNC: 13.8 G/DL (ref 11.7–15.7)
IMM GRANULOCYTES # BLD: 0 10E3/UL
IMM GRANULOCYTES NFR BLD: 0 %
LYMPHOCYTES # BLD AUTO: 1.8 10E3/UL (ref 0.8–5.3)
LYMPHOCYTES NFR BLD AUTO: 30 %
MCH RBC QN AUTO: 27.7 PG (ref 26.5–33)
MCHC RBC AUTO-ENTMCNC: 33.6 G/DL (ref 31.5–36.5)
MCV RBC AUTO: 83 FL (ref 78–100)
MONOCYTES # BLD AUTO: 0.5 10E3/UL (ref 0–1.3)
MONOCYTES NFR BLD AUTO: 7 %
NEUTROPHILS # BLD AUTO: 3.6 10E3/UL (ref 1.6–8.3)
NEUTROPHILS NFR BLD AUTO: 59 %
PLATELET # BLD AUTO: 293 10E3/UL (ref 150–450)
RBC # BLD AUTO: 4.98 10E6/UL (ref 3.8–5.2)
WBC # BLD AUTO: 6.1 10E3/UL (ref 4–11)

## 2025-02-17 PROCEDURE — 99214 OFFICE O/P EST MOD 30 MIN: CPT | Performed by: STUDENT IN AN ORGANIZED HEALTH CARE EDUCATION/TRAINING PROGRAM

## 2025-02-17 PROCEDURE — G2211 COMPLEX E/M VISIT ADD ON: HCPCS | Performed by: STUDENT IN AN ORGANIZED HEALTH CARE EDUCATION/TRAINING PROGRAM

## 2025-02-17 PROCEDURE — 85025 COMPLETE CBC W/AUTO DIFF WBC: CPT | Performed by: STUDENT IN AN ORGANIZED HEALTH CARE EDUCATION/TRAINING PROGRAM

## 2025-02-17 PROCEDURE — 76830 TRANSVAGINAL US NON-OB: CPT

## 2025-02-17 PROCEDURE — 36415 COLL VENOUS BLD VENIPUNCTURE: CPT | Performed by: STUDENT IN AN ORGANIZED HEALTH CARE EDUCATION/TRAINING PROGRAM

## 2025-02-17 ASSESSMENT — PAIN SCALES - GENERAL: PAINLEVEL_OUTOF10: MILD PAIN (2)

## 2025-02-17 NOTE — PROGRESS NOTES
Assessment & Plan     Right lower quadrant pain  Hx of appendectomy  Acute onset of low intensity dull, constant RLQ pain associated with breastfeeding in setting of constipation and known IBS. PMH of appendectomy one year ago. Symptoms overall improving. No red flag symptoms but does have TTP in RLQ with mild guarding. Unclear etiology, plan to obtain CBC, urine hCG and US pelvis today. Discussed red flag symptoms of new onset fevers, nausea/vomiting, severe pain, etc that should prompt patient to be seen more urgently. She expressed understanding and is in agreement. In meantime, recommended daily Miralax for constipation.  - CBC with platelets and differential  - HCG qualitative urine  - US Pelvic Complete with Transvaginal    Follow up in 3-5 days if symptoms worsening/persist    Zaheer Duran MD  [facility]  2/17/2025    Subjective   Heather is a 34 year old, presenting for the following health issues:  Abdominal Pain        2/17/2025     6:49 AM   Additional Questions   Roomed by kk,cma   Accompanied by  and son          2/17/2025     6:49 AM   Patient Reported Additional Medications   Patient reports taking the following new medications na     History of Present Illness       Reason for visit:  Local pain in site of apendectomy    She eats 0-1 servings of fruits and vegetables daily.She consumes 0 sweetened beverage(s) daily.She exercises with enough effort to increase her heart rate 9 or less minutes per day.  She exercises with enough effort to increase her heart rate 3 or less days per week.   She is taking medications regularly.     Has been getting worse since giving birth. Pain has been coming and going last 48 hours. Had spicy food for the first time since having it taken out. Sometimes pain comes when breast feeding     RLQ pain  Started about 2 days ago, around 4 AM.   Felt this dull, constant RLQ pain near prior appendectomy site.   Described as 2/10 in intensity but pain  "wouldn't allow her to get back to sleep.   Since that time, pain has become episodic, occurred all day yesterday on and off (0.5 to 1 out of 10 in intensity) maybe every 5-10 minutes. Sometimes up to 5-20 times an hour. This is not limiting her activities whatsoever.    She notes pain is worse when breastfeeding and attributes this to boppy pillow pushing down on that region.  Did have an episode of pain that wrapped around right lower quadrant towards back once but not in association with dull pain she describes above. This has not since recurred at all.  Events started after eating spicy [place] food the night prior so not sure if this is playing a role.   She also does endorse constipation - last BM was 2 days prior to the initiation of pain. Has since had a BM but is described as \"bunny poop\".   She endorses hx of longstanding IBS and symptoms have returned in the past couple of days as well.  No fevers, no nausea or vomiting. No diarrhea.   She is breastfeeding. No periods w/ breastfeeding. She is adamant that she could not be pregnant.   She denies any urinary symptoms. No vaginal discharge or bleeding at all.     Did have appendectomy almost exactly one year ago.  Has had prior episodes of discomfort near appectomy site, where the staple is.  Not heavy pain.  After pregnancy, would occasionally feel discomfort in the region too. Nothing significant at all.     Pain is overall much less often today. Is a 0.5 out of 10. Occurring less frequently as well.        Objective    /71   Pulse 86   Temp 98.4  F (36.9  C) (Oral)   Resp 16   Ht 1.651 m (5' 5\")   Wt 59 kg (130 lb)   SpO2 98%   Breastfeeding Yes   BMI 21.63 kg/m    Body mass index is 21.63 kg/m .    Physical Exam   GENERAL: healthy, alert and no distress  HEAD: Normocephalic, atraumatic.   EYES:Normal conjunctivae, sclera.   RESP: lungs clear to auscultation - no rales, rhonchi or wheezes  CV: regular rate and rhythm, normal S1 S2, no murmur, " click, rub or gallop.  No peripheral swelling noted.   BACK: No CVA tenderness bilaterally.  ABDOMEN: soft, TTP in RLQ, no rebound tenderness, mild guarding present. No hepatomegaly or masses appreciated. BS normactive.  MSK: no gross musculoskeletal defects noted.  SKIN: no suspicious lesions or rashes.  EXT: Warm and well perfused.  NEURO: CNII-XII grossly intact. No focal deficits.  PSYCH: Groomed, dressed appropriately for weather.  Logical, linear thought process. Normal mood with consistent affect.     Signed Electronically by: Zaheer Duran MD

## 2025-03-16 ENCOUNTER — HEALTH MAINTENANCE LETTER (OUTPATIENT)
Age: 35
End: 2025-03-16

## (undated) DEVICE — LINEN TOWEL PACK X10 5473

## (undated) DEVICE — DECANTER VIAL 2006S

## (undated) DEVICE — ENDO TROCAR BLUNT TIP KII BALLOON 12X100MM C0R47

## (undated) DEVICE — GLOVE BIOGEL PI MICRO SZ 7.5 48575

## (undated) DEVICE — ENDO TROCAR SLEEVE KII Z-THREADED 05X100MM CTS02

## (undated) DEVICE — ESU GROUND PAD ADULT W/CORD E7507

## (undated) DEVICE — ESU CORD MONOPOLAR 10'  E0510

## (undated) DEVICE — GLOVE BIOGEL PI MICRO INDICATOR UNDERGLOVE SZ 8.0 48980

## (undated) DEVICE — SOL NACL 0.9% IRRIG 1000ML BOTTLE 2F7124

## (undated) DEVICE — BLADE KNIFE SURG 11 371111

## (undated) DEVICE — LINEN HALF SHEET 5512

## (undated) DEVICE — Device

## (undated) DEVICE — SU VICRYL 0 UR-6 27" J603H

## (undated) DEVICE — SUCTION IRR STRYKERFLOW II W/TIP 250-070-520

## (undated) DEVICE — LINEN POUCH DBL 5427

## (undated) DEVICE — ENDO TROCAR FIRST ENTRY KII FIOS Z-THRD 05X100MM CTF03

## (undated) DEVICE — BAG CLEAR TRASH 1.3M 39X33" P4040C

## (undated) DEVICE — LINEN FULL SHEET 5511

## (undated) DEVICE — SU VICRYL 4-0 PS-2 18" UND J496H

## (undated) DEVICE — SOL NACL 0.9% INJ 1000ML BAG 2B1324X

## (undated) DEVICE — STPL POWERED ECHELON 45MM PSEE45A

## (undated) RX ORDER — ONDANSETRON 2 MG/ML
INJECTION INTRAMUSCULAR; INTRAVENOUS
Status: DISPENSED
Start: 2024-02-20

## (undated) RX ORDER — PROPOFOL 10 MG/ML
INJECTION, EMULSION INTRAVENOUS
Status: DISPENSED
Start: 2024-02-20

## (undated) RX ORDER — ACETAMINOPHEN 325 MG/1
TABLET ORAL
Status: DISPENSED
Start: 2024-02-20

## (undated) RX ORDER — CEFAZOLIN SODIUM/WATER 2 G/20 ML
SYRINGE (ML) INTRAVENOUS
Status: DISPENSED
Start: 2024-02-20

## (undated) RX ORDER — METHADONE HYDROCHLORIDE 10 MG/ML
INJECTION, SOLUTION INTRAMUSCULAR; INTRAVENOUS; SUBCUTANEOUS
Status: DISPENSED
Start: 2024-02-20

## (undated) RX ORDER — DEXAMETHASONE SODIUM PHOSPHATE 4 MG/ML
INJECTION, SOLUTION INTRA-ARTICULAR; INTRALESIONAL; INTRAMUSCULAR; INTRAVENOUS; SOFT TISSUE
Status: DISPENSED
Start: 2024-02-20

## (undated) RX ORDER — LIDOCAINE HYDROCHLORIDE 10 MG/ML
INJECTION, SOLUTION EPIDURAL; INFILTRATION; INTRACAUDAL; PERINEURAL
Status: DISPENSED
Start: 2024-02-20

## (undated) RX ORDER — DEXMEDETOMIDINE HYDROCHLORIDE 4 UG/ML
INJECTION, SOLUTION INTRAVENOUS
Status: DISPENSED
Start: 2024-02-20

## (undated) RX ORDER — BUPIVACAINE HYDROCHLORIDE 5 MG/ML
INJECTION, SOLUTION EPIDURAL; INTRACAUDAL
Status: DISPENSED
Start: 2024-02-20